# Patient Record
Sex: MALE | URBAN - METROPOLITAN AREA
[De-identification: names, ages, dates, MRNs, and addresses within clinical notes are randomized per-mention and may not be internally consistent; named-entity substitution may affect disease eponyms.]

---

## 2023-05-16 ENCOUNTER — INPATIENT (INPATIENT)
Facility: HOSPITAL | Age: 78
LOS: 3 days | Discharge: HOME | DRG: 871 | End: 2023-05-20
Attending: HOSPITALIST | Admitting: HOSPITALIST
Payer: MEDICARE

## 2023-05-16 VITALS
WEIGHT: 139.99 LBS | SYSTOLIC BLOOD PRESSURE: 117 MMHG | RESPIRATION RATE: 24 BRPM | HEART RATE: 87 BPM | DIASTOLIC BLOOD PRESSURE: 57 MMHG | OXYGEN SATURATION: 94 % | TEMPERATURE: 99 F

## 2023-05-16 DIAGNOSIS — A41.9 SEPSIS, UNSPECIFIED ORGANISM: ICD-10-CM

## 2023-05-16 LAB
ALBUMIN SERPL ELPH-MCNC: 4.2 G/DL — SIGNIFICANT CHANGE UP (ref 3.5–5.2)
ALP SERPL-CCNC: 80 U/L — SIGNIFICANT CHANGE UP (ref 30–115)
ALT FLD-CCNC: 12 U/L — SIGNIFICANT CHANGE UP (ref 0–41)
ANION GAP SERPL CALC-SCNC: 10 MMOL/L — SIGNIFICANT CHANGE UP (ref 7–14)
AST SERPL-CCNC: 19 U/L — SIGNIFICANT CHANGE UP (ref 0–41)
BASE EXCESS BLDV CALC-SCNC: -0.2 MMOL/L — SIGNIFICANT CHANGE UP (ref -2–3)
BASOPHILS # BLD AUTO: 0.03 K/UL — SIGNIFICANT CHANGE UP (ref 0–0.2)
BASOPHILS NFR BLD AUTO: 0.2 % — SIGNIFICANT CHANGE UP (ref 0–1)
BILIRUB SERPL-MCNC: 0.5 MG/DL — SIGNIFICANT CHANGE UP (ref 0.2–1.2)
BUN SERPL-MCNC: 19 MG/DL — SIGNIFICANT CHANGE UP (ref 10–20)
CA-I SERPL-SCNC: 1.16 MMOL/L — SIGNIFICANT CHANGE UP (ref 1.15–1.33)
CALCIUM SERPL-MCNC: 9 MG/DL — SIGNIFICANT CHANGE UP (ref 8.4–10.4)
CHLORIDE SERPL-SCNC: 100 MMOL/L — SIGNIFICANT CHANGE UP (ref 98–110)
CO2 SERPL-SCNC: 22 MMOL/L — SIGNIFICANT CHANGE UP (ref 17–32)
CREAT SERPL-MCNC: 0.9 MG/DL — SIGNIFICANT CHANGE UP (ref 0.7–1.5)
EGFR: 87 ML/MIN/1.73M2 — SIGNIFICANT CHANGE UP
EOSINOPHIL # BLD AUTO: 0.34 K/UL — SIGNIFICANT CHANGE UP (ref 0–0.7)
EOSINOPHIL NFR BLD AUTO: 2.7 % — SIGNIFICANT CHANGE UP (ref 0–8)
FLUAV AG NPH QL: SIGNIFICANT CHANGE UP
FLUBV AG NPH QL: SIGNIFICANT CHANGE UP
GAS PNL BLDV: 130 MMOL/L — LOW (ref 136–145)
GAS PNL BLDV: SIGNIFICANT CHANGE UP
GLUCOSE SERPL-MCNC: 117 MG/DL — HIGH (ref 70–99)
HCO3 BLDV-SCNC: 25 MMOL/L — SIGNIFICANT CHANGE UP (ref 22–29)
HCT VFR BLD CALC: 37.5 % — LOW (ref 42–52)
HCT VFR BLDA CALC: 45 % — SIGNIFICANT CHANGE UP (ref 39–51)
HGB BLD CALC-MCNC: 15 G/DL — SIGNIFICANT CHANGE UP (ref 12.6–17.4)
HGB BLD-MCNC: 12.4 G/DL — LOW (ref 14–18)
IMM GRANULOCYTES NFR BLD AUTO: 0.8 % — HIGH (ref 0.1–0.3)
LACTATE BLDV-MCNC: 2.2 MMOL/L — HIGH (ref 0.5–2)
LYMPHOCYTES # BLD AUTO: 0.47 K/UL — LOW (ref 1.2–3.4)
LYMPHOCYTES # BLD AUTO: 3.7 % — LOW (ref 20.5–51.1)
MCHC RBC-ENTMCNC: 33.1 G/DL — SIGNIFICANT CHANGE UP (ref 32–37)
MCHC RBC-ENTMCNC: 33.2 PG — HIGH (ref 27–31)
MCV RBC AUTO: 100.5 FL — HIGH (ref 80–94)
MONOCYTES # BLD AUTO: 0.81 K/UL — HIGH (ref 0.1–0.6)
MONOCYTES NFR BLD AUTO: 6.4 % — SIGNIFICANT CHANGE UP (ref 1.7–9.3)
NEUTROPHILS # BLD AUTO: 10.93 K/UL — HIGH (ref 1.4–6.5)
NEUTROPHILS NFR BLD AUTO: 86.2 % — HIGH (ref 42.2–75.2)
NRBC # BLD: 0 /100 WBCS — SIGNIFICANT CHANGE UP (ref 0–0)
NT-PROBNP SERPL-SCNC: 174 PG/ML — SIGNIFICANT CHANGE UP (ref 0–300)
PCO2 BLDV: 41 MMHG — LOW (ref 42–55)
PH BLDV: 7.39 — SIGNIFICANT CHANGE UP (ref 7.32–7.43)
PLATELET # BLD AUTO: 186 K/UL — SIGNIFICANT CHANGE UP (ref 130–400)
PMV BLD: 9.3 FL — SIGNIFICANT CHANGE UP (ref 7.4–10.4)
PO2 BLDV: 64 MMHG — SIGNIFICANT CHANGE UP
POTASSIUM BLDV-SCNC: 5.3 MMOL/L — HIGH (ref 3.5–5.1)
POTASSIUM SERPL-MCNC: 4 MMOL/L — SIGNIFICANT CHANGE UP (ref 3.5–5)
POTASSIUM SERPL-SCNC: 4 MMOL/L — SIGNIFICANT CHANGE UP (ref 3.5–5)
PROT SERPL-MCNC: 6.8 G/DL — SIGNIFICANT CHANGE UP (ref 6–8)
RBC # BLD: 3.73 M/UL — LOW (ref 4.7–6.1)
RBC # FLD: 11.9 % — SIGNIFICANT CHANGE UP (ref 11.5–14.5)
RSV RNA NPH QL NAA+NON-PROBE: SIGNIFICANT CHANGE UP
SAO2 % BLDV: 93.4 % — SIGNIFICANT CHANGE UP
SARS-COV-2 RNA SPEC QL NAA+PROBE: SIGNIFICANT CHANGE UP
SODIUM SERPL-SCNC: 132 MMOL/L — LOW (ref 135–146)
TROPONIN T SERPL-MCNC: <0.01 NG/ML — SIGNIFICANT CHANGE UP
WBC # BLD: 12.68 K/UL — HIGH (ref 4.8–10.8)
WBC # FLD AUTO: 12.68 K/UL — HIGH (ref 4.8–10.8)

## 2023-05-16 PROCEDURE — 97162 PT EVAL MOD COMPLEX 30 MIN: CPT | Mod: GP

## 2023-05-16 PROCEDURE — 97116 GAIT TRAINING THERAPY: CPT | Mod: GP

## 2023-05-16 PROCEDURE — 87206 SMEAR FLUORESCENT/ACID STAI: CPT

## 2023-05-16 PROCEDURE — 87070 CULTURE OTHR SPECIMN AEROBIC: CPT

## 2023-05-16 PROCEDURE — 84425 ASSAY OF VITAMIN B-1: CPT

## 2023-05-16 PROCEDURE — 85379 FIBRIN DEGRADATION QUANT: CPT

## 2023-05-16 PROCEDURE — 74230 X-RAY XM SWLNG FUNCJ C+: CPT

## 2023-05-16 PROCEDURE — 87641 MR-STAPH DNA AMP PROBE: CPT

## 2023-05-16 PROCEDURE — 83735 ASSAY OF MAGNESIUM: CPT

## 2023-05-16 PROCEDURE — 94640 AIRWAY INHALATION TREATMENT: CPT

## 2023-05-16 PROCEDURE — 93005 ELECTROCARDIOGRAM TRACING: CPT

## 2023-05-16 PROCEDURE — 84145 PROCALCITONIN (PCT): CPT

## 2023-05-16 PROCEDURE — 99291 CRITICAL CARE FIRST HOUR: CPT

## 2023-05-16 PROCEDURE — 0241U: CPT

## 2023-05-16 PROCEDURE — 87640 STAPH A DNA AMP PROBE: CPT

## 2023-05-16 PROCEDURE — 92610 EVALUATE SWALLOWING FUNCTION: CPT | Mod: GN

## 2023-05-16 PROCEDURE — 82746 ASSAY OF FOLIC ACID SERUM: CPT

## 2023-05-16 PROCEDURE — 97530 THERAPEUTIC ACTIVITIES: CPT | Mod: GP

## 2023-05-16 PROCEDURE — 82607 VITAMIN B-12: CPT

## 2023-05-16 PROCEDURE — 85025 COMPLETE CBC W/AUTO DIFF WBC: CPT

## 2023-05-16 PROCEDURE — 83605 ASSAY OF LACTIC ACID: CPT

## 2023-05-16 PROCEDURE — 71045 X-RAY EXAM CHEST 1 VIEW: CPT | Mod: 26

## 2023-05-16 PROCEDURE — 0225U NFCT DS DNA&RNA 21 SARSCOV2: CPT

## 2023-05-16 PROCEDURE — 80053 COMPREHEN METABOLIC PANEL: CPT

## 2023-05-16 PROCEDURE — 36415 COLL VENOUS BLD VENIPUNCTURE: CPT

## 2023-05-16 PROCEDURE — 87116 MYCOBACTERIA CULTURE: CPT

## 2023-05-16 PROCEDURE — 92526 ORAL FUNCTION THERAPY: CPT | Mod: GN

## 2023-05-16 PROCEDURE — 87015 SPECIMEN INFECT AGNT CONCNTJ: CPT

## 2023-05-16 PROCEDURE — 93010 ELECTROCARDIOGRAM REPORT: CPT

## 2023-05-16 PROCEDURE — 92611 MOTION FLUOROSCOPY/SWALLOW: CPT | Mod: GN

## 2023-05-16 PROCEDURE — 71250 CT THORAX DX C-: CPT

## 2023-05-16 PROCEDURE — 86480 TB TEST CELL IMMUN MEASURE: CPT

## 2023-05-16 PROCEDURE — 82962 GLUCOSE BLOOD TEST: CPT

## 2023-05-16 RX ORDER — LANOLIN ALCOHOL/MO/W.PET/CERES
3 CREAM (GRAM) TOPICAL AT BEDTIME
Refills: 0 | Status: DISCONTINUED | OUTPATIENT
Start: 2023-05-16 | End: 2023-05-20

## 2023-05-16 RX ORDER — CEFTRIAXONE 500 MG/1
1000 INJECTION, POWDER, FOR SOLUTION INTRAMUSCULAR; INTRAVENOUS ONCE
Refills: 0 | Status: COMPLETED | OUTPATIENT
Start: 2023-05-16 | End: 2023-05-16

## 2023-05-16 RX ORDER — IPRATROPIUM/ALBUTEROL SULFATE 18-103MCG
3 AEROSOL WITH ADAPTER (GRAM) INHALATION ONCE
Refills: 0 | Status: COMPLETED | OUTPATIENT
Start: 2023-05-16 | End: 2023-05-16

## 2023-05-16 RX ORDER — ACETAMINOPHEN 500 MG
975 TABLET ORAL ONCE
Refills: 0 | Status: COMPLETED | OUTPATIENT
Start: 2023-05-16 | End: 2023-05-16

## 2023-05-16 RX ORDER — ACETAMINOPHEN 500 MG
650 TABLET ORAL ONCE
Refills: 0 | Status: COMPLETED | OUTPATIENT
Start: 2023-05-16 | End: 2023-05-16

## 2023-05-16 RX ORDER — ENOXAPARIN SODIUM 100 MG/ML
40 INJECTION SUBCUTANEOUS EVERY 24 HOURS
Refills: 0 | Status: DISCONTINUED | OUTPATIENT
Start: 2023-05-16 | End: 2023-05-20

## 2023-05-16 RX ORDER — CEFEPIME 1 G/1
2000 INJECTION, POWDER, FOR SOLUTION INTRAMUSCULAR; INTRAVENOUS EVERY 12 HOURS
Refills: 0 | Status: DISCONTINUED | OUTPATIENT
Start: 2023-05-16 | End: 2023-05-17

## 2023-05-16 RX ORDER — VANCOMYCIN HCL 1 G
750 VIAL (EA) INTRAVENOUS EVERY 12 HOURS
Refills: 0 | Status: DISCONTINUED | OUTPATIENT
Start: 2023-05-16 | End: 2023-05-17

## 2023-05-16 RX ORDER — AZITHROMYCIN 500 MG/1
500 TABLET, FILM COATED ORAL ONCE
Refills: 0 | Status: COMPLETED | OUTPATIENT
Start: 2023-05-16 | End: 2023-05-16

## 2023-05-16 RX ADMIN — Medication 3 MILLILITER(S): at 21:21

## 2023-05-16 RX ADMIN — AZITHROMYCIN 255 MILLIGRAM(S): 500 TABLET, FILM COATED ORAL at 21:20

## 2023-05-16 RX ADMIN — Medication 3 MILLILITER(S): at 21:35

## 2023-05-16 RX ADMIN — Medication 650 MILLIGRAM(S): at 22:04

## 2023-05-16 RX ADMIN — Medication 3 MILLILITER(S): at 21:30

## 2023-05-16 RX ADMIN — CEFTRIAXONE 100 MILLIGRAM(S): 500 INJECTION, POWDER, FOR SOLUTION INTRAMUSCULAR; INTRAVENOUS at 21:20

## 2023-05-16 NOTE — ED PROVIDER NOTE - CARE PLAN
Principal Discharge DX:	Sepsis  Secondary Diagnosis:	Right lower lobe pneumonia  Secondary Diagnosis:	Bronchiectasis   1

## 2023-05-16 NOTE — ED ADULT NURSE NOTE - HOW OFTEN DO YOU HAVE A DRINK CONTAINING ALCOHOL?
Never Detail Level: Simple Additional Notes: [place] Kenalog 2.5mg/mL x .5cc OV only value $0 Render Risk Assessment In Note?: no

## 2023-05-16 NOTE — H&P ADULT - NSHPLABSRESULTS_GEN_ALL_CORE
12.4   12.68 )-----------( 186      ( 16 May 2023 19:45 )             37.5     05-16    132<L>  |  100  |  19  ----------------------------<  117<H>  4.0   |  22  |  0.9    Ca    9.0      16 May 2023 19:45    TPro  6.8  /  Alb  4.2  /  TBili  0.5  /  DBili  x   /  AST  19  /  ALT  12  /  AlkPhos  80  05-16      CARDIAC MARKERS ( 16 May 2023 19:45 )  x     / <0.01 ng/mL / x     / x     / x        LIVER FUNCTIONS - ( 16 May 2023 19:45 )  Alb: 4.2 g/dL / Pro: 6.8 g/dL / ALK PHOS: 80 U/L / ALT: 12 U/L / AST: 19 U/L / GGT: x           RADIOLOGY & ADDITIONAL STUDIES:

## 2023-05-16 NOTE — ED ADULT NURSE NOTE - OBJECTIVE STATEMENT
pt here for shortness of breath x 2 days. pt had recent dx of PNA. pt received combi tx in field pt here for shortness of breath x 2 days. pt had recent dx of PNA. pt received combi tx in field, aox1, disoriented to time/place/situation

## 2023-05-16 NOTE — H&P ADULT - ASSESSMENT
77-year-old male with a history of bronchiectasis, iron deficiency anemia brought in by family for evaluation of shortness of breath x2 days.     PLAN    #Suspected gram negative pneumonia in a setting of h/o bronchiectasis  #Sepsis present on admission  - febrile with Tmax 102.2 F  - Leukocytosis with WBC of 12.68   - tachypneic with RR 34   - lactate 2.2  - hypoxic with spO2 of 88% on RA; was placed on BiPAP with improvement in spO2   - remains on 3 L of NC   - s/p Azithromycin + Rocephin in the ED   - s/p Duonebs in the ED   - CXR: opacity in the R lung ( pending official read)   - start vancomycin 750 mg q12h; check vanc trough before the 4th dose   - start Cefepime 2 g q8h + Levaquin  mg once daily   - f/u MRSA swab  - f/u RVP   - f/u procal   - c/w Duonebs PRN   - ID consulted     #H/O Iron Deficiency anemia   - HgB 12.4   - .2  - Obtain iron studies   - f/u Vitamin B12 and Folate levels   - Monitor CBC for now     #DVT PPx: Lovenox subq  #GI PPx: Pantoprazole  #Diet: Regular   #Activity: IAT  #Dispo: SDU     77-year-old male with a history of bronchiectasis, iron deficiency anemia brought in by family for evaluation of shortness of breath x2 days.     PLAN    #Suspected gram negative pneumonia in a setting of h/o bronchiectasis  #Sepsis present on admission  - febrile with Tmax 102.2 F  - Leukocytosis with WBC of 12.68   - tachypneic with RR 34   - lactate 2.2  - hypoxic with spO2 of 88% on RA; was placed on BiPAP with improvement in spO2   - remains on 3 L of NC   - s/p Azithromycin + Rocephin in the ED   - s/p Duonebs in the ED   - CXR: opacity in the R lung ( pending official read)   - start vancomycin 750 mg q12h; check vanc trough before the 4th dose   - start Cefepime 2 g q12h + Levaquin  mg once daily   - f/u MRSA swab  - f/u RVP   - f/u procal   - c/w Duonebs PRN   - c/w guaianesin 400 mg q4h PRN for cough    - ID consulted     #H/O Iron Deficiency anemia   - HgB 12.4   - .2  - Obtain iron studies   - f/u Vitamin B12 and Folate levels   - Monitor CBC for now     #H/O Alzheimer's   - cw home Seroquel 25 mg QD     #DVT PPx: Lovenox subq  #GI PPx: Pantoprazole  #Diet: Regular   #Activity: IAT  #Dispo: SDU     77-year-old male with a history of bronchiectasis, iron deficiency anemia brought in by family for evaluation of shortness of breath x2 days.     PLAN    #Suspected gram negative pneumonia in a setting of h/o bronchiectasis  #Sepsis present on admission  - febrile with Tmax 102.2 F  - Leukocytosis with WBC of 12.68   - tachypneic with RR 34   - lactate 2.2  - hypoxic with spO2 of 88% on RA; was placed on BiPAP with improvement in spO2   - remains on 3 L of NC   - s/p Azithromycin + Rocephin in the ED   - s/p Duonebs in the ED   - CXR: opacity in the R lung ( pending official read)   - start vancomycin 750 mg q12h; check vanc trough before the 4th dose   - start Cefepime 2 g q12h + Levaquin  mg once daily   - f/u MRSA swab  - f/u RVP   - f/u procal   - f/u CT Chest Non contrast   - c/w Duonebs PRN   - c/w guaianesin 400 mg q4h PRN for cough    - ID consulted     #H/O Iron Deficiency anemia   - HgB 12.4   - .2  - Obtain iron studies   - f/u Vitamin B12 and Folate levels   - Monitor CBC for now     #H/O Alzheimer's   - cw home Seroquel 25 mg QD     #DVT PPx: Lovenox subq  #GI PPx: Pantoprazole  #Diet: Regular   #Activity: IAT  #Dispo: SDU

## 2023-05-16 NOTE — ED PROVIDER NOTE - OBJECTIVE STATEMENT
77-year-old male history of bronchiectasis, iron deficiency anemia brought in by family for evaluation of shortness of breath x2 days.  +new productive cough, chills, body aches and subjective fever.  Denies any runny nose, sore throat, sick contacts, recent travel, chest pain, abdominal pain, vomiting, diarrhea, leg pain and swelling.

## 2023-05-16 NOTE — H&P ADULT - CONVERSATION DETAILS
Pt has a had multiple admissions for pneumonia and bronchiectasis. Goals of Care Conversation done with daughter. Discussed FULL CODE VS DNR/DNI. CPR and intubation discussed with daughter agreed to Both. Pt family wishes the pt to be FULL code.  All questions answered.

## 2023-05-16 NOTE — H&P ADULT - NSICDXPASTMEDICALHX_GEN_ALL_CORE_FT
PAST MEDICAL HISTORY:  H/O bronchiectasis      PAST MEDICAL HISTORY:  Alzheimer's dementia     H/O bronchiectasis     H/O iron deficiency anemia

## 2023-05-16 NOTE — ED PROVIDER NOTE - CLINICAL SUMMARY MEDICAL DECISION MAKING FREE TEXT BOX
77-year-old male with history of bronchiectasis presents to the ED for shortness of breath over the past few days.  Noted to be febrile and tachypneic and hypoxic in the ED.  Patient was upgraded to critical care and placed on BiPAP immediately.  Patient noted diffuse wheezing and diffuse retractions.  Daughter at bedside who provided most of the story.  Patient was immediately seen by me.  Started on antibiotics.  Given fluids.  On reevaluation patient is tolerating BiPAP.  More alert.  Labs reviewed by me noted to have leukocytosis, mildly elevated lactate.  Case discussed with ICU.  Candidate for stepdown.  Admitted to stepdown.

## 2023-05-16 NOTE — H&P ADULT - NSHPPHYSICALEXAM_GEN_ALL_CORE
T(C): 39 (05-16-23 @ 19:10), Max: 39 (05-16-23 @ 19:10)  HR: 104 (05-16-23 @ 19:10) (87 - 104)  BP: 132/62 (05-16-23 @ 19:10) (117/57 - 132/62)  RR: 34 (05-16-23 @ 19:10) (24 - 34)  SpO2: 96% (05-16-23 @ 19:10) (94% - 96%)    CONSTITUTIONAL: Well groomed, no apparent distress    EYES: PERRLA and symmetric, EOMI, No conjunctival or scleral injection, non-icteric    ENMT: Oral mucosa with moist membranes. No external nasal lesions; nasal mucosa not inflamed; normal dentition; no pharyngeal injection or exudates    NECK: Supple, symmetric and without tracheal deviation; thyroid gland not enlarged and without palpable masses    RESPIRATORY:     CARDIOVASCULAR: RRRR, +S1S2, no murmurs  	  GASTROINTESTINAL: Soft, non tender, non distended, no rebound, no guarding  	  MUSCULOSKELETAL: generalized weakness +     SKIN: No rashes or ulcers noted; no subcutaneous nodules or induration palpable    NEUROLOGIC: No focal deficit     PSYCHIATRIC: Appropriate insight/judgment; A+O x 3, mood and affect appropriate, recent/remote memory intact T(C): 39 (05-16-23 @ 19:10), Max: 39 (05-16-23 @ 19:10)  HR: 104 (05-16-23 @ 19:10) (87 - 104)  BP: 132/62 (05-16-23 @ 19:10) (117/57 - 132/62)  RR: 34 (05-16-23 @ 19:10) (24 - 34)  SpO2: 96% (05-16-23 @ 19:10) (94% - 96%)    CONSTITUTIONAL: Well groomed, no apparent distress    EYES: PERRLA and symmetric, EOMI, No conjunctival or scleral injection, non-icteric    ENMT: Oral mucosa with moist membranes. No external nasal lesions; nasal mucosa not inflamed; normal dentition; no pharyngeal injection or exudates    NECK: Supple, symmetric and without tracheal deviation; thyroid gland not enlarged and without palpable masses    RESPIRATORY: Scattered wheezing bilateral lungs; decreased bs on inspiration on the R middle lobe area     CARDIOVASCULAR: RRRR, +S1S2, no murmurs  	  GASTROINTESTINAL: Soft, non tender, non distended, no rebound, no guarding  	  MUSCULOSKELETAL: generalized weakness +     SKIN: No rashes or ulcers noted; no subcutaneous nodules or induration palpable    NEUROLOGIC: No focal deficit     PSYCHIATRIC: Appropriate insight/judgment; A+O x 3, mood and affect appropriate, recent/remote memory intact

## 2023-05-16 NOTE — H&P ADULT - NSHPREVIEWOFSYSTEMS_GEN_ALL_CORE
CONSTITUTIONAL: As per HPI   EYES/ENT: No visual changes;  No vertigo or throat pain   NECK: No pain or stiffness  RESPIRATORY: As per HPI   CARDIOVASCULAR: No chest pain or palpitations  GASTROINTESTINAL: No abdominal or epigastric pain. No nausea, vomiting, or hematemesis; No diarrhea or constipation. No melena or hematochezia.  GENITOURINARY: No dysuria, frequency or hematuria  NEUROLOGICAL: No numbness or weakness  SKIN: No itching, rashes

## 2023-05-16 NOTE — ED ADULT NURSE REASSESSMENT NOTE - NSFALLRISKINTERV_ED_ALL_ED
Assistance OOB with selected safe patient handling equipment if applicable/Assistance with ambulation/Communicate fall risk and risk factors to all staff, patient, and family/Monitor gait and stability/Provide visual cue: yellow wristband, yellow gown, etc/Reinforce activity limits and safety measures with patient and family/Call bell, personal items and telephone in reach/Instruct patient to call for assistance before getting out of bed/chair/stretcher/Non-slip footwear applied when patient is off stretcher/[place] to call system/Physically safe environment - no spills, clutter or unnecessary equipment/Purposeful Proactive Rounding/Room/bathroom lighting operational, light cord in reach

## 2023-05-16 NOTE — ED PROVIDER NOTE - PHYSICAL EXAMINATION
CONSTITUTIONAL: Well-appearing; well-nourished; in no apparent distress.   EYES: PERRL; EOM intact.   ENT: normal nose; no rhinorrhea; normal pharynx with no tonsillar hypertrophy.   NECK: Supple; non-tender; no cervical lymphadenopathy.   CARDIOVASCULAR: Normal S1, S2; no murmurs, rubs, or gallops.   RESPIRATORY: Positive hypoxia O2 sat 89% on room air 88% on tachypnea.  Positive bilateral wheezing and rhonchi noted.  GI/: Normal bowel sounds; non-distended; non-tender; no palpable organomegaly.   MS: No evidence of trauma or deformity. Normal ROM in all four extremities; non-tender to palpation; distal pulses are normal.   SKIN: Normal for age and race; warm; dry; good turgor; no apparent lesions or exudate.   Extrem: No peripheral edema.  No calf tenderness.  NEURO/PSYCH: A & O x 3; grossly unremarkable. mood and manner are appropriate. Grooming and personal hygiene are appropriate.

## 2023-05-16 NOTE — H&P ADULT - ATTENDING COMMENTS
HPI as above.  Interval history: Pt seen and examined at bedside. DW daughter at bedside. Daughter states that the pt has been in and out of the hospital multiple times for pneumonia. She states that he coughs when he eats. No cp, Pt has dementia at baseline   Vital Signs (24 Hrs):  T(C): 36.6 (05-17-23 @ 16:32), Max: 39 (05-16-23 @ 19:10)  HR: 102 (05-17-23 @ 16:32) (77 - 104)  BP: 127/79 (05-17-23 @ 16:32) (116/58 - 136/63)  RR: 18 (05-17-23 @ 16:32) (18 - 34)  SpO2: 96% (05-17-23 @ 16:32) (94% - 100%)  Wt(kg): --  Daily     Daily     I&O's Summary    PHYSICAL EXAM:  GENERAL: NAD, well-developed  HEAD:  Atraumatic, Normocephalic  EYES: EOMI, PERRLA, conjunctiva and sclera clear  NECK: Supple, No JVD  CHEST/LUNG: BL ronchi   HEART: Regular rate and rhythm; No murmurs, rubs, or gallops  ABDOMEN: Soft, Nontender, Nondistended; Bowel sounds present  EXTREMITIES:  2+ Peripheral Pulses, No clubbing, cyanosis, or edema  PSYCH: Alert   NEUROLOGY: non-focal  SKIN: No rashes or lesions  Labs reviewed  Imaging reviewed independently and reviewed read  < from: CT Chest No Cont (05.17.23 @ 04:50) >    IMPRESSION:  Motion artifact throughout the lungs limits evaluation.    Right middle lobe right lower lobe, and left upper lobe bronchiectasis is   present. Near-complete atelectasis of the right lower lobe.    Scattered tree-in-bud nodules seen along the periphery of the right   middle lobe. Mild tree-in-bud nodules are also seen in the superior   segment of the left lower lobe (5/145).    Findings are suspicious for infection. TB should be excluded.    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    EKG reviewed independently and reviewed read  < from: 12 Lead ECG (05.17.23 @ 02:42) >    Diagnosis Line Normal sinus rhythm  Normal ECG    < end of copied text >    Plan  #ARF 2/2 acute on chronic bronchiectasis 2/2 gram neg vs gram pos pneumonia vs aspiration pneumonia   -CT noted, DW ID low suspicion for TB but requesting AFBs cultures   - antibiotics as per ID   - dw SS and pt doing MBS  - check duplex     #rest as above    #Progress Note Handoff  Pending (specify):  follow up ID, afbs, and as above  Family discussion: house staff updated pt family  Disposition:  downgrade from SDU to medicine as per CC   Decision to admit the pt is based on acuity as above  High risk given above acuity and comorbidities. labs reviewed. DW ID

## 2023-05-16 NOTE — H&P ADULT - HISTORY OF PRESENT ILLNESS
77-year-old male with a history of bronchiectasis, iron deficiency anemia brought in by family for evaluation of shortness of breath x2 days. Patient had new productive cough, chills, body aches and subjective fever.  Denies any runny nose, sore throat, sick contacts, recent travel, chest pain, abdominal pain, vomiting, diarrhea, leg pain and swelling.     In the ED vitals were:    T(F): 102.2  HR: 104  BP: 132/62  RR: 34  SpO2: 96%    Labs were significant for Leukocytosis with WBC 12.68 with neutrophilia (ANC 1093), Hgb 12.4, .5, Na+ 132; VBG: Lactate 2.3     Images: CXR wet read: opacity in the R lung     Patient was hypoxic to  88 %. He was placed on BiPAP with improvement in the spO2. Patient remains on 3 L of NC. He was given duonebs and was given Azithromycin and Rocephin in the ED.     Patient is being admitted to SDU for close monitoring and management of acute pneumonia.  77-year-old male with a history of bronchiectasis, iron deficiency anemia, and alzheimer's brought in by family for evaluation of shortness of breath x2 days. History obtained from patient's daughter at bedside. As per daughter, patient was admitted to [facility] in 08/2022 for similar complaints and was found to have Pneumonia there. Since then patient has had chronic cough and has been feeling weak. Patient had worsening cough for the past couple days and felt sudden shortness of breath and palpitations prompting the family to bring him to ED.  Patient had new productive cough with yellow sputum, chills, body aches and subjective fever.  Denies any runny nose, sore throat, sick contacts, recent travel, chest pain, abdominal pain, vomiting, diarrhea, leg pain and swelling.     In the ED vitals were:    T(F): 102.2  HR: 104  BP: 132/62  RR: 34  SpO2: 96%    Labs were significant for Leukocytosis with WBC 12.68 with neutrophilia (ANC 1093), Hgb 12.4, .5, Na+ 132; VBG: Lactate 2.3     Images: CXR wet read: opacity in the R lung     Patient was hypoxic to  88 %. He was placed on BiPAP with improvement in the spO2. Patient remains on 3 L of NC. He was given duonebs and was given Azithromycin and Rocephin in the ED.     Patient is being admitted to SDU for close monitoring and management of acute pneumonia.

## 2023-05-17 DIAGNOSIS — Z98.890 OTHER SPECIFIED POSTPROCEDURAL STATES: Chronic | ICD-10-CM

## 2023-05-17 LAB
ALBUMIN SERPL ELPH-MCNC: 3.7 G/DL — SIGNIFICANT CHANGE UP (ref 3.5–5.2)
ALP SERPL-CCNC: 64 U/L — SIGNIFICANT CHANGE UP (ref 30–115)
ALT FLD-CCNC: 11 U/L — SIGNIFICANT CHANGE UP (ref 0–41)
ANION GAP SERPL CALC-SCNC: 10 MMOL/L — SIGNIFICANT CHANGE UP (ref 7–14)
AST SERPL-CCNC: 18 U/L — SIGNIFICANT CHANGE UP (ref 0–41)
BASOPHILS # BLD AUTO: 0.02 K/UL — SIGNIFICANT CHANGE UP (ref 0–0.2)
BASOPHILS NFR BLD AUTO: 0.2 % — SIGNIFICANT CHANGE UP (ref 0–1)
BILIRUB SERPL-MCNC: 0.7 MG/DL — SIGNIFICANT CHANGE UP (ref 0.2–1.2)
BUN SERPL-MCNC: 12 MG/DL — SIGNIFICANT CHANGE UP (ref 10–20)
CALCIUM SERPL-MCNC: 8.7 MG/DL — SIGNIFICANT CHANGE UP (ref 8.4–10.4)
CHLORIDE SERPL-SCNC: 103 MMOL/L — SIGNIFICANT CHANGE UP (ref 98–110)
CO2 SERPL-SCNC: 26 MMOL/L — SIGNIFICANT CHANGE UP (ref 17–32)
CREAT SERPL-MCNC: 0.7 MG/DL — SIGNIFICANT CHANGE UP (ref 0.7–1.5)
D DIMER BLD IA.RAPID-MCNC: 365 NG/ML DDU — HIGH
EGFR: 94 ML/MIN/1.73M2 — SIGNIFICANT CHANGE UP
EOSINOPHIL # BLD AUTO: 0.32 K/UL — SIGNIFICANT CHANGE UP (ref 0–0.7)
EOSINOPHIL NFR BLD AUTO: 4 % — SIGNIFICANT CHANGE UP (ref 0–8)
GLUCOSE BLDC GLUCOMTR-MCNC: 137 MG/DL — HIGH (ref 70–99)
GLUCOSE SERPL-MCNC: 137 MG/DL — HIGH (ref 70–99)
HCT VFR BLD CALC: 35.8 % — LOW (ref 42–52)
HGB BLD-MCNC: 11.7 G/DL — LOW (ref 14–18)
IMM GRANULOCYTES NFR BLD AUTO: 0.9 % — HIGH (ref 0.1–0.3)
LACTATE SERPL-SCNC: 1.2 MMOL/L — SIGNIFICANT CHANGE UP (ref 0.7–2)
LYMPHOCYTES # BLD AUTO: 0.54 K/UL — LOW (ref 1.2–3.4)
LYMPHOCYTES # BLD AUTO: 6.7 % — LOW (ref 20.5–51.1)
MAGNESIUM SERPL-MCNC: 2.1 MG/DL — SIGNIFICANT CHANGE UP (ref 1.8–2.4)
MCHC RBC-ENTMCNC: 32.7 G/DL — SIGNIFICANT CHANGE UP (ref 32–37)
MCHC RBC-ENTMCNC: 33 PG — HIGH (ref 27–31)
MCV RBC AUTO: 100.8 FL — HIGH (ref 80–94)
MONOCYTES # BLD AUTO: 0.67 K/UL — HIGH (ref 0.1–0.6)
MONOCYTES NFR BLD AUTO: 8.3 % — SIGNIFICANT CHANGE UP (ref 1.7–9.3)
MRSA PCR RESULT.: NEGATIVE — SIGNIFICANT CHANGE UP
NEUTROPHILS # BLD AUTO: 6.41 K/UL — SIGNIFICANT CHANGE UP (ref 1.4–6.5)
NEUTROPHILS NFR BLD AUTO: 79.9 % — HIGH (ref 42.2–75.2)
NRBC # BLD: 0 /100 WBCS — SIGNIFICANT CHANGE UP (ref 0–0)
PLATELET # BLD AUTO: 161 K/UL — SIGNIFICANT CHANGE UP (ref 130–400)
PMV BLD: 9.4 FL — SIGNIFICANT CHANGE UP (ref 7.4–10.4)
POTASSIUM SERPL-MCNC: 3.8 MMOL/L — SIGNIFICANT CHANGE UP (ref 3.5–5)
POTASSIUM SERPL-SCNC: 3.8 MMOL/L — SIGNIFICANT CHANGE UP (ref 3.5–5)
PROCALCITONIN SERPL-MCNC: 0.08 NG/ML — SIGNIFICANT CHANGE UP (ref 0.02–0.1)
PROT SERPL-MCNC: 6.1 G/DL — SIGNIFICANT CHANGE UP (ref 6–8)
RBC # BLD: 3.55 M/UL — LOW (ref 4.7–6.1)
RBC # FLD: 12.1 % — SIGNIFICANT CHANGE UP (ref 11.5–14.5)
SODIUM SERPL-SCNC: 139 MMOL/L — SIGNIFICANT CHANGE UP (ref 135–146)
WBC # BLD: 8.03 K/UL — SIGNIFICANT CHANGE UP (ref 4.8–10.8)
WBC # FLD AUTO: 8.03 K/UL — SIGNIFICANT CHANGE UP (ref 4.8–10.8)

## 2023-05-17 PROCEDURE — 71250 CT THORAX DX C-: CPT | Mod: 26

## 2023-05-17 PROCEDURE — 93010 ELECTROCARDIOGRAM REPORT: CPT

## 2023-05-17 PROCEDURE — 99497 ADVNCD CARE PLAN 30 MIN: CPT | Mod: 25

## 2023-05-17 PROCEDURE — 99223 1ST HOSP IP/OBS HIGH 75: CPT

## 2023-05-17 PROCEDURE — 99233 SBSQ HOSP IP/OBS HIGH 50: CPT

## 2023-05-17 RX ORDER — ALBUTEROL 90 UG/1
2 AEROSOL, METERED ORAL
Refills: 0 | DISCHARGE

## 2023-05-17 RX ORDER — IPRATROPIUM/ALBUTEROL SULFATE 18-103MCG
3 AEROSOL WITH ADAPTER (GRAM) INHALATION EVERY 6 HOURS
Refills: 0 | Status: DISCONTINUED | OUTPATIENT
Start: 2023-05-17 | End: 2023-05-20

## 2023-05-17 RX ORDER — QUETIAPINE FUMARATE 200 MG/1
25 TABLET, FILM COATED ORAL AT BEDTIME
Refills: 0 | Status: DISCONTINUED | OUTPATIENT
Start: 2023-05-17 | End: 2023-05-20

## 2023-05-17 RX ORDER — QUETIAPINE FUMARATE 200 MG/1
1 TABLET, FILM COATED ORAL
Refills: 0 | DISCHARGE

## 2023-05-17 RX ORDER — CEFEPIME 1 G/1
2000 INJECTION, POWDER, FOR SOLUTION INTRAMUSCULAR; INTRAVENOUS EVERY 8 HOURS
Refills: 0 | Status: DISCONTINUED | OUTPATIENT
Start: 2023-05-17 | End: 2023-05-19

## 2023-05-17 RX ADMIN — Medication 3 MILLILITER(S): at 07:50

## 2023-05-17 RX ADMIN — Medication 3 MILLILITER(S): at 15:34

## 2023-05-17 RX ADMIN — ENOXAPARIN SODIUM 40 MILLIGRAM(S): 100 INJECTION SUBCUTANEOUS at 21:36

## 2023-05-17 RX ADMIN — CEFEPIME 100 MILLIGRAM(S): 1 INJECTION, POWDER, FOR SOLUTION INTRAMUSCULAR; INTRAVENOUS at 05:44

## 2023-05-17 RX ADMIN — CEFEPIME 100 MILLIGRAM(S): 1 INJECTION, POWDER, FOR SOLUTION INTRAMUSCULAR; INTRAVENOUS at 15:32

## 2023-05-17 RX ADMIN — CEFEPIME 100 MILLIGRAM(S): 1 INJECTION, POWDER, FOR SOLUTION INTRAMUSCULAR; INTRAVENOUS at 21:37

## 2023-05-17 RX ADMIN — Medication 250 MILLIGRAM(S): at 05:44

## 2023-05-17 RX ADMIN — QUETIAPINE FUMARATE 25 MILLIGRAM(S): 200 TABLET, FILM COATED ORAL at 21:36

## 2023-05-17 RX ADMIN — Medication 40 MILLIGRAM(S): at 11:53

## 2023-05-17 NOTE — CONSULT NOTE ADULT - TIME BILLING
77-year-old male with a history of bronchiectasis, iron deficiency anemia, and alzheimer's brought in by family for evaluation of shortness of breath x2 days    Impressions  #Sepsis present on admission (fever, WBC>12)  #Bronchiectasis exacerbation  - CT Chest No Cont (05.17.23 @ 04:50): Motion artifact throughout the lungs limits evaluation. Right middle lobe right lower lobe, and left upper lobe bronchiectasis is  present. Near-complete atelectasis of the right lower lobe. Scattered tree-in-bud nodules seen along the periphery of the right   middle lobe. Mild tree-in-bud nodules are also seen in the superior  segment of the left lower lobe (5/145). Findings are suspicious for infection. TB should be excluded. 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    #Alzheimer's dementia     Recommendations  - Discussed with family member -- long standing history of bronchiectasis since childhood - last hospitalization in 7/2022 at [facility]; last treated in March 2023 for bronchiectasis flare with levofloxacin   - likely bronchiectasis flare, but at risk for MAC/ALLEN - no previous films to compare CT imaging   - would try to reach out to his pulmonologist if worked up for NTM -- if not,  would check AFB cx x 3 for NTM work-up (low suspicion for Pulmonary tuberculosis)   - check quantiferon gold   - continue cefepime 2g q 8 hours   - continue doxycycline 100 mg BID for 5 days   - follow-up blood cx   - trend fever curve     Please call or message on Microsoft Teams if with any questions.  Spectra [phone]

## 2023-05-17 NOTE — CONSULT NOTE ADULT - ASSESSMENT
IMPRESSION:    Acute hypoxemic respiratory failure   Bronchiectasis with exacerbation   HO bronchiectasis   HO Dementia and poor functional status     PLAN:    CNS:  Avoid depressants.  Check b12, Folate, and thiamine levels.      HEENT: Oral care.  Speech and swallow when more awake     PULMONARY:  HOB @ 45 degrees.  Aspiration precautions.  Wean O2 as tolerated.  Aggressive pulmonary toilet  Nebs Q4 and PRN.  Solumedrol 40 mg daily for 5 days.      CARDIOVASCULAR:  Gentle hydration while NPO     GI: GI prophylaxis.  Feeding per speech.  Bowel regimen     RENAL:  Follow up lytes.  Correct as needed    INFECTIOUS DISEASE: Follow up cultures.  ROcephin and Doxy for now./  Procal.  Full RVP negative.  NasaL MRSA     HEMATOLOGICAL:  DVT prophylaxis.  Dimer     ENDOCRINE:  Follow up FS.  Insulin protocol if needed.    MUSCULOSKELETAL:  Bed rest.  PT OT     Downgrade to floor

## 2023-05-17 NOTE — PHYSICAL THERAPY INITIAL EVALUATION ADULT - ADDITIONAL COMMENTS
pt lives with family, has a flight of stairs to climb, ambulates with RW and assist, climbs stairs with min assist from family member

## 2023-05-17 NOTE — SWALLOW BEDSIDE ASSESSMENT ADULT - SWALLOW EVAL: DIAGNOSIS
+ overt s/s of aspiration with thin liquids. Mild oral dysphagia with regular solids. + tolerance for puree, soft & bite sized with mildly thick liquids without overt s/s of penetration/ aspiration. + overt s/s of aspiration with thin liquids. Mild oral dysphagia with regular solids. + tolerance for puree, soft & bite sized with mildly thick liquids without overt s/s of penetration/ aspiration. MBSS instrumental swallow is indicated to further assess swallow physiology 2' suspected aspiration PNA.

## 2023-05-17 NOTE — GOALS OF CARE CONVERSATION - ADVANCED CARE PLANNING - CONVERSATION DETAILS
Goals of care discussed with patient and daughter bedside given multiple hospitalizations for bronchiectasis exacerbations.   They would like to remain FULL/CODE.

## 2023-05-17 NOTE — PHYSICAL THERAPY INITIAL EVALUATION ADULT - GENERAL OBSERVATIONS, REHAB EVAL
2:50-3:28 pt encountered in bed in NAD, daughter present and able to translate.  +tele, + O2 via nasal canula, 3L/min

## 2023-05-17 NOTE — PATIENT PROFILE ADULT - FALL HARM RISK - HARM RISK INTERVENTIONS

## 2023-05-17 NOTE — CONSULT NOTE ADULT - SUBJECTIVE AND OBJECTIVE BOX
Consultation Requested by:    Patient is a 77y old  Male who presents with a chief complaint of Dyspnea (17 May 2023 07:11)    HPI:  77-year-old male with a history of bronchiectasis, iron deficiency anemia, and alzheimer's brought in by family for evaluation of shortness of breath x2 days. History obtained from patient's daughter at bedside. As per daughter, patient was admitted to [facility] in 08/2022 for similar complaints and was found to have Pneumonia there. Since then patient has had chronic cough and has been feeling weak. Patient had worsening cough for the past couple days and felt sudden shortness of breath and palpitations prompting the family to bring him to ED.  Patient had new productive cough with yellow sputum, chills, body aches and subjective fever.  Denies any runny nose, sore throat, sick contacts, recent travel, chest pain, abdominal pain, vomiting, diarrhea, leg pain and swelling.     In the ED vitals were:    T(F): 102.2  HR: 104  BP: 132/62  RR: 34  SpO2: 96%    Labs were significant for Leukocytosis with WBC 12.68 with neutrophilia (ANC 1093), Hgb 12.4, .5, Na+ 132; VBG: Lactate 2.3     Images: CXR wet read: opacity in the R lung     Patient was hypoxic to  88 %. He was placed on BiPAP with improvement in the spO2. Patient remains on 3 L of NC. He was given duonebs and was given Azithromycin and Rocephin in the ED.     Patient is being admitted to SDU for close monitoring and management of acute pneumonia.  (16 May 2023 22:11)      REVIEW OF SYSTEMS  All review of systems negative, except for those marked:  General:		[] Abnormal:  	[] Night Sweats		[] Fever		[] Weight Loss  Pulmonary/Cough:	[] Abnormal:  Cardiac/Chest Pain:	[] Abnormal:  Gastrointestinal:	[] Abnormal:  Eyes:			[] Abnormal:  ENT:			[] Abnormal:  Dysuria:		[] Abnormal:  Musculoskeletal	:	[] Abnormal:  Endocrine:		[] Abnormal:  Lymph Nodes:		[] Abnormal:  Headache:		[] Abnormal:  Skin:			[] Abnormal:  Allergy/Immune:	[] Abnormal:  Psychiatric:		[] Abnormal:  [] All other review of systems negative  [] Unable to obtain (explain):    Recent Ill Contacts:	[] No	[] Yes:  Recent Travel History:	[] No	[] Yes:  Recent Animal/Insect Exposure/Tick Bites:	[] No	[] Yes:    Allergies    No Known Allergies    Intolerances      Antimicrobials:  cefepime   IVPB 2000 milliGRAM(s) IV Intermittent every 12 hours  levoFLOXacin IVPB 750 milliGRAM(s) IV Intermittent every 24 hours  levoFLOXacin IVPB      vancomycin  IVPB 750 milliGRAM(s) IV Intermittent every 12 hours      Other Medications:  albuterol/ipratropium for Nebulization 3 milliLiter(s) Nebulizer every 6 hours PRN  enoxaparin Injectable 40 milliGRAM(s) SubCutaneous every 24 hours  melatonin 3 milliGRAM(s) Oral at bedtime PRN  methylPREDNISolone sodium succinate Injectable 40 milliGRAM(s) IV Push daily  QUEtiapine 25 milliGRAM(s) Oral at bedtime      FAMILY HISTORY:    PAST MEDICAL & SURGICAL HISTORY:  H/O bronchiectasis      Alzheimer's dementia      H/O iron deficiency anemia      History of lung surgery        SOCIAL HISTORY:    IMMUNIZATIONS  [] Up to Date		[] Not Up to Date:  Recent Immunizations:	[] No	[] Yes:    Daily     Daily   Head Circumference:  Vital Signs Last 24 Hrs  T(C): 36.5 (17 May 2023 07:48), Max: 39 (16 May 2023 19:10)  T(F): 97.7 (17 May 2023 07:48), Max: 102.2 (16 May 2023 19:10)  HR: 77 (17 May 2023 07:48) (77 - 104)  BP: 136/63 (17 May 2023 07:48) (116/58 - 136/63)  BP(mean): --  RR: 18 (17 May 2023 07:48) (18 - 34)  SpO2: 97% (17 May 2023 07:48) (94% - 100%)    Parameters below as of 17 May 2023 07:48  Patient On (Oxygen Delivery Method): nasal cannula  O2 Flow (L/min): 3      PHYSICAL EXAM  All physical exam findings normal, except for those marked:  General:	Normal: alert, neither acutely nor chronically ill-appearing, well developed/well   .		nourished, no respiratory distress  .		[] Abnormal:  Eyes		Normal: no conjunctival injection, no discharge, no photophobia, intact   .		extraocular movements, sclera not icteric  .		[] Abnormal:  ENT:		Normal: normal tympanic membranes; external ear normal, nares normal without   .		discharge, no pharyngeal erythema or exudates, no oral mucosal lesions, normal   .		tongue and lips  .		[] Abnormal:  Neck		Normal: supple, full range of motion, no nuchal rigidity  .		[] Abnormal:  Lymph Nodes	Normal: normal size and consistency, non-tender  .		[] Abnormal:  Cardiovascular	Normal: regular rate and variability; Normal S1, S2; No murmur  .		[] Abnormal:  Respiratory	Normal: no wheezing or crackles, bilateral audible breath sounds, no retractions  .		[] Abnormal:  Abdominal	Normal: soft; non-distended; non-tender; no hepatosplenomegaly or masses  .		[] Abnormal:  		Normal: normal external genitalia, no rash  .		[] Abnormal:  Extremities	Normal: FROM x4, no cyanosis or edema, symmetric pulses  .		[] Abnormal:  Skin		Normal: skin intact and not indurated; no rash, no desquamation  .		[] Abnormal:  Neurologic	Normal: alert, oriented as age-appropriate, affect appropriate; no weakness, no   .		facial asymmetry, moves all extremities, normal gait-child older than 18 months  .		[] Abnormal:  Musculoskeletal		Normal: no joint swelling, erythema, or tenderness; full range of motion   .			with no contractures; no muscle tenderness; no clubbing; no cyanosis;   .			no edema  .			[] Abnormal    Respiratory Support:		[] No	[] Yes:  Vasoactive medication infusion:	[] No	[] Yes:  Venous catheters:		[] No	[] Yes:  Bladder catheter:		[] No	[] Yes:  Other catheters or tubes:	[] No	[] Yes:    Lab Results:                        11.7   8.03  )-----------( 161      ( 17 May 2023 06:55 )             35.8     05-17    139  |  103  |  12  ----------------------------<  137<H>  3.8   |  26  |  0.7    Ca    8.7      17 May 2023 06:55  Mg     2.1     05-17    TPro  6.1  /  Alb  3.7  /  TBili  0.7  /  DBili  x   /  AST  18  /  ALT  11  /  AlkPhos  64  05-17    LIVER FUNCTIONS - ( 17 May 2023 06:55 )  Alb: 3.7 g/dL / Pro: 6.1 g/dL / ALK PHOS: 64 U/L / ALT: 11 U/L / AST: 18 U/L / GGT: x                 MICROBIOLOGY    [] Pathology slides reviewed and/or discussed with pathologist  [] Microbiology findings discussed with microbiologist or slides reviewed  [] Images erviewed with radiologist  [] Case discussed with an attending physician in addition to the patient's primary physician  [] Records, reports from outside [facility] reviewed    [] Patient requires continued monitoring for:  [] Total critical care time spent by attending physician: __ minutes, excluding procedure time. Consultation Requested by:    Patient is a 77y old  Male who presents with a chief complaint of Dyspnea (17 May 2023 07:11)    HPI:  77-year-old male with a history of bronchiectasis, iron deficiency anemia, and alzheimer's brought in by family for evaluation of shortness of breath x2 days. History obtained from patient's daughter at bedside. As per daughter, patient was admitted to [facility] in 08/2022 for similar complaints and was found to have Pneumonia there. Since then patient has had chronic cough and has been feeling weak. Patient had worsening cough for the past couple days and felt sudden shortness of breath and palpitations prompting the family to bring him to ED.  Patient had new productive cough with yellow sputum, chills, body aches and subjective fever.  Denies any runny nose, sore throat, sick contacts, recent travel, chest pain, abdominal pain, vomiting, diarrhea, leg pain and swelling.     In the ED vitals were:    T(F): 102.2  HR: 104  BP: 132/62  RR: 34  SpO2: 96%    Labs were significant for Leukocytosis with WBC 12.68 with neutrophilia (ANC 1093), Hgb 12.4, .5, Na+ 132; VBG: Lactate 2.3     Images: CXR wet read: opacity in the R lung     Patient was hypoxic to  88 %. He was placed on BiPAP with improvement in the spO2. Patient remains on 3 L of NC. He was given duonebs and was given Azithromycin and Rocephin in the ED.     Patient is being admitted to SDU for close monitoring and management of acute pneumonia.  (16 May 2023 22:11)      REVIEW OF SYSTEMS  All review of systems negative, except for those marked:  General:		[] Abnormal:  	[] Night Sweats		[x] Fever		[] Weight Loss  Pulmonary/Cough:	[x] Abnormal:  Cardiac/Chest Pain:	[] Abnormal:  Gastrointestinal:	[] Abnormal:  Eyes:			[] Abnormal:  ENT:			[] Abnormal:  Dysuria:		[] Abnormal:  Musculoskeletal	:	[] Abnormal:  Endocrine:		[] Abnormal:  Lymph Nodes:		[] Abnormal:  Headache:		[] Abnormal:  Skin:			[] Abnormal:  Allergy/Immune:	[] Abnormal:  Psychiatric:		[] Abnormal:  [] All other review of systems negative  [] Unable to obtain (explain):    Recent Ill Contacts:	[] No	[x] Yes:  Recent Travel History:	[x] No	[] Yes:  Recent Animal/Insect Exposure/Tick Bites:	[x] No	[] Yes:    Allergies    No Known Allergies    Intolerances      Antimicrobials:  cefepime   IVPB 2000 milliGRAM(s) IV Intermittent every 12 hours  levoFLOXacin IVPB 750 milliGRAM(s) IV Intermittent every 24 hours  levoFLOXacin IVPB      vancomycin  IVPB 750 milliGRAM(s) IV Intermittent every 12 hours      Other Medications:  albuterol/ipratropium for Nebulization 3 milliLiter(s) Nebulizer every 6 hours PRN  enoxaparin Injectable 40 milliGRAM(s) SubCutaneous every 24 hours  melatonin 3 milliGRAM(s) Oral at bedtime PRN  methylPREDNISolone sodium succinate Injectable 40 milliGRAM(s) IV Push daily  QUEtiapine 25 milliGRAM(s) Oral at bedtime      FAMILY HISTORY:    PAST MEDICAL & SURGICAL HISTORY:  H/O bronchiectasis      Alzheimer's dementia      H/O iron deficiency anemia      History of lung surgery        SOCIAL HISTORY:    IMMUNIZATIONS  [] Up to Date		[] Not Up to Date:  Recent Immunizations:	[] No	[] Yes:    Daily     Daily   Head Circumference:  Vital Signs Last 24 Hrs  T(C): 36.5 (17 May 2023 07:48), Max: 39 (16 May 2023 19:10)  T(F): 97.7 (17 May 2023 07:48), Max: 102.2 (16 May 2023 19:10)  HR: 77 (17 May 2023 07:48) (77 - 104)  BP: 136/63 (17 May 2023 07:48) (116/58 - 136/63)  BP(mean): --  RR: 18 (17 May 2023 07:48) (18 - 34)  SpO2: 97% (17 May 2023 07:48) (94% - 100%)    Parameters below as of 17 May 2023 07:48  Patient On (Oxygen Delivery Method): nasal cannula  O2 Flow (L/min): 3      PHYSICAL EXAM  All physical exam findings normal, except for those marked:  General:	Normal: alert, ill appearing, on 3-4 o2 NC satting 94-97%  .		[] Abnormal:  Eyes		Normal: no conjunctival injection, no discharge, no photophobia, intact   .		extraocular movements, sclera not icteric  .		[] Abnormal:  ENT:		Normal: normal tympanic membranes; external ear normal, nares normal without   .		discharge, no pharyngeal erythema or exudates, no oral mucosal lesions, normal   .		tongue and lips  .		[] Abnormal:  Neck		Normal: supple, full range of motion, no nuchal rigidity  .		[] Abnormal:  Lymph Nodes	Normal: normal size and consistency, non-tender  .		[] Abnormal:  Cardiovascular	Normal: regular rate and variability; Normal S1, S2; No murmur  .		[] Abnormal:  Respiratory	Normal: no wheezing or crackles, bilateral audible breath sounds, no retractions  .		[] Abnormal:  Abdominal	Normal: soft; non-distended; non-tender; no hepatosplenomegaly or masses  .		[] Abnormal:  		Normal: normal external genitalia, no rash  .		[] Abnormal:  Extremities	Normal: FROM x4, no cyanosis or edema, symmetric pulses  .		[] Abnormal:  Skin		Normal: skin intact and not indurated; no rash, no desquamation  .		[] Abnormal:  Neurologic	Normal: alert, oriented as age-appropriate, affect appropriate; no weakness, no   .		facial asymmetry, moves all extremities, normal gait-child older than 18 months  .		[] Abnormal:  Musculoskeletal		Normal: no joint swelling, erythema, or tenderness; full range of motion   .			with no contractures; no muscle tenderness; no clubbing; no cyanosis;   .			no edema  .			[] Abnormal    Respiratory Support:		[] No	[] Yes:  Vasoactive medication infusion:	[] No	[] Yes:  Venous catheters:		[] No	[] Yes:  Bladder catheter:		[] No	[] Yes:  Other catheters or tubes:	[] No	[] Yes:    Lab Results:                        11.7   8.03  )-----------( 161      ( 17 May 2023 06:55 )             35.8     05-17    139  |  103  |  12  ----------------------------<  137<H>  3.8   |  26  |  0.7    Ca    8.7      17 May 2023 06:55  Mg     2.1     05-17    TPro  6.1  /  Alb  3.7  /  TBili  0.7  /  DBili  x   /  AST  18  /  ALT  11  /  AlkPhos  64  05-17    LIVER FUNCTIONS - ( 17 May 2023 06:55 )  Alb: 3.7 g/dL / Pro: 6.1 g/dL / ALK PHOS: 64 U/L / ALT: 11 U/L / AST: 18 U/L / GGT: x                 MICROBIOLOGY    [] Pathology slides reviewed and/or discussed with pathologist  [] Microbiology findings discussed with microbiologist or slides reviewed  [] Images erviewed with radiologist  [] Case discussed with an attending physician in addition to the patient's primary physician  [] Records, reports from outside [facility] [place]    [] Patient requires continued monitoring for:  [] Total critical care time spent by attending physician: __ minutes, excluding procedure time. Consultation Requested by:    Patient is a 77y old  Male who presents with a chief complaint of Dyspnea (17 May 2023 07:11)    HPI:  77-year-old male with a history of bronchiectasis, iron deficiency anemia, and alzheimer's brought in by family for evaluation of shortness of breath x2 days. History obtained from patient's daughter at bedside. As per daughter, patient was admitted to [facility] in 08/2022 for similar complaints and was found to have Pneumonia there. Since then patient has had chronic cough and has been feeling weak. Patient had worsening cough for the past couple days and felt sudden shortness of breath and palpitations prompting the family to bring him to ED.  Patient had new productive cough with yellow sputum, chills, body aches and subjective fever.  Denies any runny nose, sore throat, sick contacts, recent travel, chest pain, abdominal pain, vomiting, diarrhea, leg pain and swelling.     In the ED vitals were:    T(F): 102.2  HR: 104  BP: 132/62  RR: 34  SpO2: 96%    Labs were significant for Leukocytosis with WBC 12.68 with neutrophilia (ANC 1093), Hgb 12.4, .5, Na+ 132; VBG: Lactate 2.3     Images: CXR wet read: opacity in the R lung     Patient was hypoxic to  88 %. He was placed on BiPAP with improvement in the spO2. Patient remains on 3 L of NC. He was given duonebs and was given Azithromycin and Rocephin in the ED.     Patient is being admitted to SDU for close monitoring and management of acute pneumonia.  (16 May 2023 22:11)      REVIEW OF SYSTEMS  All review of systems negative, except for those marked:  General:		[] Abnormal:  	[] Night Sweats		[x] Fever		[] Weight Loss  Pulmonary/Cough:	[x] Abnormal:  Cardiac/Chest Pain:	[] Abnormal:  Gastrointestinal:	[] Abnormal:  Eyes:			[] Abnormal:  ENT:			[] Abnormal:  Dysuria:		[] Abnormal:  Musculoskeletal	:	[] Abnormal:  Endocrine:		[] Abnormal:  Lymph Nodes:		[] Abnormal:  Headache:		[] Abnormal:  Skin:			[] Abnormal:  Allergy/Immune:	[] Abnormal:  Psychiatric:		[] Abnormal:  [] All other review of systems negative  [] Unable to obtain (explain):    Recent Ill Contacts:	[] No	[x] Yes:  Recent Travel History:	[x] No	[] Yes:  Recent Animal/Insect Exposure/Tick Bites:	[x] No	[] Yes:    Allergies    No Known Allergies    Intolerances      Antimicrobials:  cefepime   IVPB 2000 milliGRAM(s) IV Intermittent every 12 hours  levoFLOXacin IVPB 750 milliGRAM(s) IV Intermittent every 24 hours  levoFLOXacin IVPB      vancomycin  IVPB 750 milliGRAM(s) IV Intermittent every 12 hours      Other Medications:  albuterol/ipratropium for Nebulization 3 milliLiter(s) Nebulizer every 6 hours PRN  enoxaparin Injectable 40 milliGRAM(s) SubCutaneous every 24 hours  melatonin 3 milliGRAM(s) Oral at bedtime PRN  methylPREDNISolone sodium succinate Injectable 40 milliGRAM(s) IV Push daily  QUEtiapine 25 milliGRAM(s) Oral at bedtime      FAMILY HISTORY:    PAST MEDICAL & SURGICAL HISTORY:  H/O bronchiectasis      Alzheimer's dementia      H/O iron deficiency anemia      History of lung surgery        SOCIAL HISTORY:    IMMUNIZATIONS  [] Up to Date		[] Not Up to Date:  Recent Immunizations:	[] No	[] Yes:    Daily     Daily   Head Circumference:  Vital Signs Last 24 Hrs  T(C): 36.5 (17 May 2023 07:48), Max: 39 (16 May 2023 19:10)  T(F): 97.7 (17 May 2023 07:48), Max: 102.2 (16 May 2023 19:10)  HR: 77 (17 May 2023 07:48) (77 - 104)  BP: 136/63 (17 May 2023 07:48) (116/58 - 136/63)  BP(mean): --  RR: 18 (17 May 2023 07:48) (18 - 34)  SpO2: 97% (17 May 2023 07:48) (94% - 100%)    Parameters below as of 17 May 2023 07:48  Patient On (Oxygen Delivery Method): nasal cannula  O2 Flow (L/min): 3      PHYSICAL EXAM  All physical exam findings normal, except for those marked:  General:	Normal: alert, ill appearing, on 3-4 o2 NC satting 94-97%  .		[] Abnormal:  Eyes		Normal: no conjunctival injection, no discharge, no photophobia, intact   .		extraocular movements, sclera not icteric  .		[] Abnormal:  ENT:		Normal: normal tympanic membranes; external ear normal, nares normal without   .		discharge, no pharyngeal erythema or exudates, no oral mucosal lesions, normal   .		tongue and lips  .		[] Abnormal:  Neck		Normal: supple, full range of motion, no nuchal rigidity  .		[] Abnormal:  Lymph Nodes	Normal: normal size and consistency, non-tender  .		[] Abnormal:  Cardiovascular	Normal: regular rate and variability; Normal S1, S2; No murmur  .		[] Abnormal:  Respiratory	Normal: no wheezing or crackles, bilateral audible breath sounds, no retractions  .		[] Abnormal:  Abdominal	Normal: soft; non-distended; non-tender; no hepatosplenomegaly or masses  .		[] Abnormal:  		Normal: normal external genitalia, no rash  .		[] Abnormal:  Extremities	Normal: FROM x4, no cyanosis or edema, symmetric pulses  .		[] Abnormal:  Skin		Normal: skin intact and not indurated; no rash, no desquamation  .		[] Abnormal:  Neurologic	Normal: alert, oriented as age-appropriate, affect appropriate; no weakness, no   .		facial asymmetry, moves all extremities, normal gait-child older than 18 months  .		[] Abnormal:  Musculoskeletal		Normal: no joint swelling, erythema, or tenderness; full range of motion   .			with no contractures; no muscle tenderness; no clubbing; no cyanosis;   .			no edema  .			[] Abnormal    Respiratory Support:		[] No	[] Yes:  Vasoactive medication infusion:	[] No	[] Yes:  Venous catheters:		[] No	[] Yes:  Bladder catheter:		[] No	[] Yes:  Other catheters or tubes:	[] No	[] Yes:    Lab Results:                        11.7   8.03  )-----------( 161      ( 17 May 2023 06:55 )             35.8     05-17    139  |  103  |  12  ----------------------------<  137<H>  3.8   |  26  |  0.7    Ca    8.7      17 May 2023 06:55  Mg     2.1     05-17    TPro  6.1  /  Alb  3.7  /  TBili  0.7  /  DBili  x   /  AST  18  /  ALT  11  /  AlkPhos  64  05-17    LIVER FUNCTIONS - ( 17 May 2023 06:55 )  Alb: 3.7 g/dL / Pro: 6.1 g/dL / ALK PHOS: 64 U/L / ALT: 11 U/L / AST: 18 U/L / GGT: x               < from: CT Chest No Cont (05.17.23 @ 04:50) >  LUNGS, PLEURA, AIRWAYS: The central airways are patent. Bilateral   calcified pleural plaques present. Right middle lobe right lower lobe,   and left upper lobe bronchiectasis is present. Near-complete atelectasis   of the right lower lobe. Minimal left basilar atelectasis. No pleural   effusion or pneumothorax. No honeycombing.    < end of copied text >

## 2023-05-17 NOTE — SWALLOW BEDSIDE ASSESSMENT ADULT - ADDITIONAL RECOMMENDATIONS
SLP will plan to f/u to assess candidacy for instrumental swallow. SLP will f/u 5/18 with MBSS instrumental swallow.

## 2023-05-17 NOTE — PHYSICAL THERAPY INITIAL EVALUATION ADULT - PERTINENT HX OF CURRENT PROBLEM, REHAB EVAL
77-year-old male with a history of bronchiectasis, iron deficiency anemia, and alzheimer's brought in by family for evaluation of shortness of breath x2 days. History obtained from patient's daughter at bedside. As per daughter, patient was admitted to [facility] in 08/2022 for similar complaints and was found to have Pneumonia there. Since then patient has had chronic cough and has been feeling weak. Patient had worsening cough for the past couple days and felt sudden shortness of breath and palpitations prompting the family to bring him to ED.  Patient had new productive cough with yellow sputum, chills, body aches and subjective fever.  Denies any runny nose, sore throat, sick contacts, recent travel, chest pain, abdominal pain, vomiting, diarrhea, leg pain and swelling.

## 2023-05-17 NOTE — CONSULT NOTE ADULT - ASSESSMENT
Assessment and Plan    # Bronchiectasis Exacerbation  -Cefepime 2g q8 IV  -Doxycycline 100mg q12 IV  -steroids as per primary team  -would likely need minimum 10-14 day course total duration    d/w'd with ID attending Dr. Clark    Note is incomplete pending updates to ROS/PE and Assessment Assessment and Plan  76 yo M with bronchiectasis, asbestos exposure, mod-severe alzheimer's dz presents with acute dypsnea, fevers. Dx'd with bronchiectasis flare. Pt has had one hospitalization in July 2022 at [facility] treated for pna. Had two episodes of pna outpatient last treated with abx roughly March 2023. Pt's daughter notes her children had a cough recently. No recent travel. Etiology of bronchiectasis is unclear as per daughter, has not been worked up. Pt has a pulmonologist in [place] Dr. Luis Manuel Castaneda. See CT scan finding above. MRSA nares negative.    # sepsis present on admission  # Bronchiectasis Exacerbation  # Hx of asbestos exposure  -Cefepime 2g q8 IV  -Doxycycline 100mg q12 IV  -sputum culture  -blood cultures  -full RVP  -steroids as per primary team  -would likely need minimum 10-14 day course total duration    d/w'd with ID attending Dr. Clark

## 2023-05-17 NOTE — CONSULT NOTE ADULT - SUBJECTIVE AND OBJECTIVE BOX
Patient is a 77y old  Male who presents with a chief complaint of Dyspnea (16 May 2023 22:11)      HPI:  77-year-old male with a history of bronchiectasis, iron deficiency anemia, and alzheimer's brought in by family for evaluation of shortness of breath x2 days. History obtained from patient's daughter at bedside. As per daughter, patient was admitted to [facility] in 08/2022 for similar complaints and was found to have Pneumonia there. Since then patient has had chronic cough and has been feeling weak. Patient had worsening cough for the past couple days and felt sudden shortness of breath and palpitations prompting the family to bring him to ED.  Patient had new productive cough with yellow sputum, chills, body aches and subjective fever.  Denies any runny nose, sore throat, sick contacts, recent travel, chest pain, abdominal pain, vomiting, diarrhea, leg pain and swelling.     In the ED vitals were:    T(F): 102.2  HR: 104  BP: 132/62  RR: 34  SpO2: 96%    Labs were significant for Leukocytosis with WBC 12.68 with neutrophilia (ANC 1093), Hgb 12.4, .5, Na+ 132; VBG: Lactate 2.3     Images: CXR wet read: opacity in the R lung     Patient was hypoxic to  88 %. He was placed on BiPAP with improvement in the spO2. Patient remains on 3 L of NC. He was given duonebs and was given Azithromycin and Rocephin in the ED.     Patient is being admitted to SDU for close monitoring and management of acute pneumonia.  (16 May 2023 22:11)      PAST MEDICAL & SURGICAL HISTORY:  H/O bronchiectasis      Alzheimer's dementia      H/O iron deficiency anemia      History of lung surgery          SOCIAL HX:   Smoking      UTO                    ETOH                            Other    FAMILY HISTORY:  :  No known cardiovacular family hisotry     Review Of Systems:     All ROS are negative except per HPI       Allergies    No Known Allergies    Intolerances          PHYSICAL EXAM    ICU Vital Signs Last 24 Hrs  T(C): 36.5 (17 May 2023 05:50), Max: 39 (16 May 2023 19:10)  T(F): 97.7 (17 May 2023 05:50), Max: 102.2 (16 May 2023 19:10)  HR: 84 (17 May 2023 05:50) (79 - 104)  BP: 116/58 (17 May 2023 05:50) (116/58 - 132/62)  BP(mean): --  ABP: --  ABP(mean): --  RR: 18 (17 May 2023 05:50) (18 - 34)  SpO2: 100% (17 May 2023 05:50) (94% - 100%)    O2 Parameters below as of 17 May 2023 05:50  Patient On (Oxygen Delivery Method): nasal cannula  O2 Flow (L/min): 3          CONSTITUTIONAL:  Ill appearing in NAD    ENT:   Airway patent,   Mouth with normal mucosa.   No thrush      CARDIAC:   Normal rate,   Regular rhythm.    No edema    RESPIRATORY:   No wheezing  Bibasilar crackles    Not tachypneic,  No use of accessory muscles    GASTROINTESTINAL:  Abdomen soft,   Non-tender,   No guarding,   + BS      NEUROLOGICAL:   Confused  No motor deficits.    SKIN:   Skin normal color for race,   No evidence of rash.                LABS:                          12.4   12.68 )-----------( 186      ( 16 May 2023 19:45 )             37.5                                               05-16    132<L>  |  100  |  19  ----------------------------<  117<H>  4.0   |  22  |  0.9    Ca    9.0      16 May 2023 19:45    TPro  6.8  /  Alb  4.2  /  TBili  0.5  /  DBili  x   /  AST  19  /  ALT  12  /  AlkPhos  80  05-16                                                 CARDIAC MARKERS ( 16 May 2023 19:45 )  x     / <0.01 ng/mL / x     / x     / x                                                LIVER FUNCTIONS - ( 16 May 2023 19:45 )  Alb: 4.2 g/dL / Pro: 6.8 g/dL / ALK PHOS: 80 U/L / ALT: 12 U/L / AST: 19 U/L / GGT: x                                                                                                                                       X-Rays reviewed                                                                                     ECHO    CXR interpreted by me     MEDICATIONS  (STANDING):  cefepime   IVPB 2000 milliGRAM(s) IV Intermittent every 12 hours  enoxaparin Injectable 40 milliGRAM(s) SubCutaneous every 24 hours  levoFLOXacin IVPB 750 milliGRAM(s) IV Intermittent every 24 hours  levoFLOXacin IVPB      QUEtiapine 25 milliGRAM(s) Oral at bedtime  vancomycin  IVPB 750 milliGRAM(s) IV Intermittent every 12 hours    MEDICATIONS  (PRN):  albuterol/ipratropium for Nebulization 3 milliLiter(s) Nebulizer every 6 hours PRN Shortness of Breath and/or Wheezing  melatonin 3 milliGRAM(s) Oral at bedtime PRN Insomnia

## 2023-05-18 LAB
ALBUMIN SERPL ELPH-MCNC: 4 G/DL — SIGNIFICANT CHANGE UP (ref 3.5–5.2)
ALP SERPL-CCNC: 68 U/L — SIGNIFICANT CHANGE UP (ref 30–115)
ALT FLD-CCNC: 13 U/L — SIGNIFICANT CHANGE UP (ref 0–41)
ANION GAP SERPL CALC-SCNC: 9 MMOL/L — SIGNIFICANT CHANGE UP (ref 7–14)
AST SERPL-CCNC: 16 U/L — SIGNIFICANT CHANGE UP (ref 0–41)
BASOPHILS # BLD AUTO: 0.01 K/UL — SIGNIFICANT CHANGE UP (ref 0–0.2)
BASOPHILS NFR BLD AUTO: 0.1 % — SIGNIFICANT CHANGE UP (ref 0–1)
BILIRUB SERPL-MCNC: 0.9 MG/DL — SIGNIFICANT CHANGE UP (ref 0.2–1.2)
BUN SERPL-MCNC: 11 MG/DL — SIGNIFICANT CHANGE UP (ref 10–20)
CALCIUM SERPL-MCNC: 9 MG/DL — SIGNIFICANT CHANGE UP (ref 8.4–10.5)
CHLORIDE SERPL-SCNC: 100 MMOL/L — SIGNIFICANT CHANGE UP (ref 98–110)
CO2 SERPL-SCNC: 26 MMOL/L — SIGNIFICANT CHANGE UP (ref 17–32)
CREAT SERPL-MCNC: 0.7 MG/DL — SIGNIFICANT CHANGE UP (ref 0.7–1.5)
EGFR: 94 ML/MIN/1.73M2 — SIGNIFICANT CHANGE UP
EOSINOPHIL # BLD AUTO: 0.01 K/UL — SIGNIFICANT CHANGE UP (ref 0–0.7)
EOSINOPHIL NFR BLD AUTO: 0.1 % — SIGNIFICANT CHANGE UP (ref 0–8)
GLUCOSE BLDC GLUCOMTR-MCNC: 118 MG/DL — HIGH (ref 70–99)
GLUCOSE BLDC GLUCOMTR-MCNC: 119 MG/DL — HIGH (ref 70–99)
GLUCOSE BLDC GLUCOMTR-MCNC: 127 MG/DL — HIGH (ref 70–99)
GLUCOSE BLDC GLUCOMTR-MCNC: 144 MG/DL — HIGH (ref 70–99)
GLUCOSE SERPL-MCNC: 200 MG/DL — HIGH (ref 70–99)
GRAM STN FLD: SIGNIFICANT CHANGE UP
HCT VFR BLD CALC: 38.7 % — LOW (ref 42–52)
HGB BLD-MCNC: 13 G/DL — LOW (ref 14–18)
IMM GRANULOCYTES NFR BLD AUTO: 1 % — HIGH (ref 0.1–0.3)
LYMPHOCYTES # BLD AUTO: 0.24 K/UL — LOW (ref 1.2–3.4)
LYMPHOCYTES # BLD AUTO: 3.5 % — LOW (ref 20.5–51.1)
MAGNESIUM SERPL-MCNC: 2.2 MG/DL — SIGNIFICANT CHANGE UP (ref 1.8–2.4)
MCHC RBC-ENTMCNC: 33.6 G/DL — SIGNIFICANT CHANGE UP (ref 32–37)
MCHC RBC-ENTMCNC: 33.8 PG — HIGH (ref 27–31)
MCV RBC AUTO: 100.5 FL — HIGH (ref 80–94)
MONOCYTES # BLD AUTO: 0.07 K/UL — LOW (ref 0.1–0.6)
MONOCYTES NFR BLD AUTO: 1 % — LOW (ref 1.7–9.3)
NEUTROPHILS # BLD AUTO: 6.52 K/UL — HIGH (ref 1.4–6.5)
NEUTROPHILS NFR BLD AUTO: 94.3 % — HIGH (ref 42.2–75.2)
NRBC # BLD: 0 /100 WBCS — SIGNIFICANT CHANGE UP (ref 0–0)
PLATELET # BLD AUTO: 166 K/UL — SIGNIFICANT CHANGE UP (ref 130–400)
PMV BLD: 9.2 FL — SIGNIFICANT CHANGE UP (ref 7.4–10.4)
POTASSIUM SERPL-MCNC: 3.9 MMOL/L — SIGNIFICANT CHANGE UP (ref 3.5–5)
POTASSIUM SERPL-SCNC: 3.9 MMOL/L — SIGNIFICANT CHANGE UP (ref 3.5–5)
PROT SERPL-MCNC: 6.4 G/DL — SIGNIFICANT CHANGE UP (ref 6–8)
RAPID RVP RESULT: DETECTED
RBC # BLD: 3.85 M/UL — LOW (ref 4.7–6.1)
RBC # FLD: 12 % — SIGNIFICANT CHANGE UP (ref 11.5–14.5)
RV+EV RNA SPEC QL NAA+PROBE: DETECTED
SARS-COV-2 RNA SPEC QL NAA+PROBE: SIGNIFICANT CHANGE UP
SODIUM SERPL-SCNC: 135 MMOL/L — SIGNIFICANT CHANGE UP (ref 135–146)
SPECIMEN SOURCE: SIGNIFICANT CHANGE UP
WBC # BLD: 6.92 K/UL — SIGNIFICANT CHANGE UP (ref 4.8–10.8)
WBC # FLD AUTO: 6.92 K/UL — SIGNIFICANT CHANGE UP (ref 4.8–10.8)

## 2023-05-18 PROCEDURE — 99232 SBSQ HOSP IP/OBS MODERATE 35: CPT

## 2023-05-18 PROCEDURE — 74230 X-RAY XM SWLNG FUNCJ C+: CPT | Mod: 26

## 2023-05-18 RX ADMIN — Medication 100 MILLIGRAM(S): at 17:11

## 2023-05-18 RX ADMIN — CEFEPIME 100 MILLIGRAM(S): 1 INJECTION, POWDER, FOR SOLUTION INTRAMUSCULAR; INTRAVENOUS at 05:37

## 2023-05-18 RX ADMIN — Medication 100 MILLIGRAM(S): at 06:03

## 2023-05-18 RX ADMIN — ENOXAPARIN SODIUM 40 MILLIGRAM(S): 100 INJECTION SUBCUTANEOUS at 21:16

## 2023-05-18 RX ADMIN — CEFEPIME 100 MILLIGRAM(S): 1 INJECTION, POWDER, FOR SOLUTION INTRAMUSCULAR; INTRAVENOUS at 21:16

## 2023-05-18 RX ADMIN — QUETIAPINE FUMARATE 25 MILLIGRAM(S): 200 TABLET, FILM COATED ORAL at 21:16

## 2023-05-18 RX ADMIN — CEFEPIME 100 MILLIGRAM(S): 1 INJECTION, POWDER, FOR SOLUTION INTRAMUSCULAR; INTRAVENOUS at 13:18

## 2023-05-18 RX ADMIN — Medication 40 MILLIGRAM(S): at 05:37

## 2023-05-18 NOTE — PROGRESS NOTE ADULT - ASSESSMENT
77-year-old male with a history of bronchiectasis, iron deficiency anemia brought in by family for evaluation of shortness of breath x2 days. 77-year-old male with a history of bronchiectasis, iron deficiency anemia, and alzheimer's brought in by family for evaluation of shortness of breath x2 days. History obtained from patient's daughter at bedside. As per daughter, patient was admitted to [facility] in 08/2022 for similar complaints and was found to have Pneumonia there. Since then patient has had chronic cough and has been feeling weak. Patient had worsening cough for the past couple days and felt sudden shortness of breath and palpitations prompting the family to bring him to ED.  Patient had new productive cough with yellow sputum, chills, body aches and subjective fever.  Denies any runny nose, sore throat, sick contacts, recent travel, chest pain, abdominal pain, vomiting, diarrhea, leg pain and swelling.     #Viral pneumonia in a setting of h/o bronchiectasis  #Sepsis present on admission  - pt positive for entero/rhino virus on rvp   - pt is now satting well on RA  - s/p Azithromycin + Rocephin in the ED   - s/p Duonebs in the ED   - CXR: Left midlung opacities. Right mid to lower lung pleural plaques and opacities. No pneumothorax. No significant pleural effusion on frontal view. Stable cardiomediastinal silhouette  - CT chest: Right middle lobe right lower lobe, and left upper lobe bronchiectasis is present. Near-complete atelectasis of the right lower lobe. Scattered tree-in-bud nodules seen along the periphery of the right middle lobe. Mild tree-in-bud nodules are also seen in the superior segment of the left lower lobe (5/145). Findings are suspicious for infection. TB should be excluded. 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  - neg MRSA swab  -  procal - negative 0.08  - ID consulted :  Discussed with family member -- long standing history of bronchiectasis since childhood - last hospitalization in 7/2022 at [facility]; last treated in March 2023 for bronchiectasis flare with levofloxacin.  likely bronchiectasis flare, but at risk for MAC/ALLEN - no previous films to compare CT imaging . would try to reach out to his pulmonologist if worked up for NTM -- if not,  would check AFB cx x 3 for NTM work-up (low suspicion for Pulmonary tuberculosis). check quantiferon gold. continue cefepime 2g q 8 hours. continue doxycycline 100 mg BID for 5 days. follow-up blood cx. trend fever curve   - 5/19 to discuss with ID stopping antibiotics    #H/O Iron Deficiency anemia   - HgB 12.4   - .2  - Obtain iron studies   - f/u Vitamin B12 and Folate levels   - Monitor CBC for now     #H/O Alzheimer's   - cw home Seroquel 25 mg QD     #DVT PPx: Lovenox subq  #GI PPx: Pantoprazole  #Diet: Regular   #Activity: IAT  #Dispo: med floor    Pending : afb x 3, discussion on stopping antibiotics with ID

## 2023-05-18 NOTE — SWALLOW VFSS/MBS ASSESSMENT ADULT - DIAGNOSTIC IMPRESSIONS
Severe pharyngeal dysphagia with thin liquids as evidenced by deep penetration and aspiration during and after the swallow. Mild/moderate oropharyngeal dysphagia with mildly thick liquids, puree and soft & bite sized solids. Dysphagia is likely chronic and related to patient's h/o Alzheimer's.

## 2023-05-18 NOTE — SWALLOW VFSS/MBS ASSESSMENT ADULT - PHARYNGEAL PHASE COMMENTS
Pharyngeal impairments include: Partial superior movement of thyroid cartilage/partial approximation of arytenoids to epiglottic petiole, partial anterior hyoid excursion, incomplete laryngeal vestibular closure resulting in deep penetration of thin liquids via tsp during the swallow and aspiration (silent) of controlled and sequential sips of thin liquids. For thins via spoon scoring 5/8 Penetration/ aspiration (PAS) and scoring 8/8 PAS scale. Present, but diminished pharyngeal stripping wave, partial distention of PES segment, narrow column of contrast of air between tongue base and posterior pharyngeal wall. Collection of pharyngeal residue remaining in the valleculae and pyriform sinuses. Pt. noted with aspiration of remainder pharyngeal residue after the swallow. Pharyngeal impairments include: Partial superior movement of thyroid cartilage/partial approximation of arytenoids to epiglottic petiole, partial anterior hyoid excursion, incomplete laryngeal vestibular closure. Present, but diminished pharyngeal stripping wave, partial distention of PES segment, narrow column of contrast of air between tongue base and posterior pharyngeal wall. Collection of pharyngeal residue remaining in the valleculae and pyriform sinuses. Pt. noted with x1 instance of penetration with mildly thick liquids after the swallow during liquids wash strategy scoring a 3/8 in penetration/ aspiration scale (PAS).

## 2023-05-18 NOTE — PROGRESS NOTE ADULT - ASSESSMENT
77-year-old male with a history of bronchiectasis, iron deficiency anemia brought in by family for evaluation of shortness of breath x2 days.     PLAN    #Suspected gram negative pneumonia in a setting of h/o bronchiectasis  #Sepsis present on admission  - febrile with Tmax 102.2 F  - Leukocytosis with WBC of 12.68   - tachypneic with RR 34   - lactate 2.2  - hypoxic with spO2 of 88% on RA; was placed on BiPAP with improvement in spO2   - remains on 3 L of NC   - s/p Azithromycin + Rocephin in the ED   - s/p Duonebs in the ED   - CXR: opacity in the R lung ( pending official read)   - start vancomycin 750 mg q12h; check vanc trough before the 4th dose   - start Cefepime 2 g q12h + Levaquin  mg once daily   - f/u MRSA swab  - f/u RVP   - f/u procal   - f/u CT Chest Non contrast   - c/w Duonebs PRN   - c/w guaianesin 400 mg q4h PRN for cough    - ID consulted     #H/O Iron Deficiency anemia   - HgB 12.4   - .2  - Obtain iron studies   - f/u Vitamin B12 and Folate levels   - Monitor CBC for now     #H/O Alzheimer's   - cw home Seroquel 25 mg QD     #DVT PPx: Lovenox subq  #GI PPx: Pantoprazole  #Diet: Regular   #Activity: IAT  #Dispo: SDU 77-year-old male with a history of bronchiectasis, iron deficiency anemia brought in by family for evaluation of shortness of breath x2 days. 77-year-old male with a history of bronchiectasis, iron deficiency anemia, and alzheimer's brought in by family for evaluation of shortness of breath x2 days. History obtained from patient's daughter at bedside. As per daughter, patient was admitted to [facility] in 08/2022 for similar complaints and was found to have Pneumonia there. Since then patient has had chronic cough and has been feeling weak. Patient had worsening cough for the past couple days and felt sudden shortness of breath and palpitations prompting the family to bring him to ED.  Patient had new productive cough with yellow sputum, chills, body aches and subjective fever.  Denies any runny nose, sore throat, sick contacts, recent travel, chest pain, abdominal pain, vomiting, diarrhea, leg pain and swelling.     #Suspected gram negative pneumonia in a setting of h/o bronchiectasis  #Sepsis present on admission  - febrile with Tmax 102.2 F  - Leukocytosis with WBC of 12.68   - tachypneic with RR 34   - lactate 2.2  - hypoxic with spO2 of 88% on RA; was placed on BiPAP with improvement in spO2   - remains on 3 L of NC -- WILL WEAN OFF  - s/p Azithromycin + Rocephin in the ED   - s/p Duonebs in the ED   - CXR: Left midlung opacities. Right mid to lower lung pleural plaques and opacities. No pneumothorax. No significant pleural effusion on frontal view. Stable cardiomediastinal silhouette  - CT chest: Right middle lobe right lower lobe, and left upper lobe bronchiectasis is present. Near-complete atelectasis of the right lower lobe. Scattered tree-in-bud nodules seen along the periphery of the right middle lobe. Mild tree-in-bud nodules are also seen in the superior segment of the left lower lobe (5/145). Findings are suspicious for infection. TB should be excluded. 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  - f/u MRSA swab  - f/u RVP   - f/u procal     - ID consulted :  Discussed with family member -- long standing history of bronchiectasis since childhood - last hospitalization in 7/2022 at [facility]; last treated in March 2023 for bronchiectasis flare with levofloxacin.  likely bronchiectasis flare, but at risk for MAC/ALLEN - no previous films to compare CT imaging . would try to reach out to his pulmonologist if worked up for NTM -- if not,  would check AFB cx x 3 for NTM work-up (low suspicion for Pulmonary tuberculosis). check quantiferon gold. continue cefepime 2g q 8 hours. continue doxycycline 100 mg BID for 5 days. follow-up blood cx. trend fever curve       #H/O Iron Deficiency anemia   - HgB 12.4   - .2  - Obtain iron studies   - f/u Vitamin B12 and Folate levels   - Monitor CBC for now     #H/O Alzheimer's   - cw home Seroquel 25 mg QD     #DVT PPx: Lovenox subq  #GI PPx: Pantoprazole  #Diet: Regular   #Activity: IAT  #Dispo: SDU    Pending        77-year-old male with a history of bronchiectasis, iron deficiency anemia brought in by family for evaluation of shortness of breath x2 days. 77-year-old male with a history of bronchiectasis, iron deficiency anemia, and alzheimer's brought in by family for evaluation of shortness of breath x2 days. History obtained from patient's daughter at bedside. As per daughter, patient was admitted to [facility] in 08/2022 for similar complaints and was found to have Pneumonia there. Since then patient has had chronic cough and has been feeling weak. Patient had worsening cough for the past couple days and felt sudden shortness of breath and palpitations prompting the family to bring him to ED.  Patient had new productive cough with yellow sputum, chills, body aches and subjective fever.  Denies any runny nose, sore throat, sick contacts, recent travel, chest pain, abdominal pain, vomiting, diarrhea, leg pain and swelling.     #Suspected gram negative pneumonia in a setting of h/o bronchiectasis  #Sepsis present on admission  - febrile with Tmax 102.2 F  - Leukocytosis with WBC of 12.68   - tachypneic with RR 34   - lactate 2.2  - hypoxic with spO2 of 88% on RA; was placed on BiPAP with improvement in spO2   - remains on 3 L of NC -- WILL WEAN OFF  - s/p Azithromycin + Rocephin in the ED   - s/p Duonebs in the ED   - CXR: Left midlung opacities. Right mid to lower lung pleural plaques and opacities. No pneumothorax. No significant pleural effusion on frontal view. Stable cardiomediastinal silhouette  - CT chest: Right middle lobe right lower lobe, and left upper lobe bronchiectasis is present. Near-complete atelectasis of the right lower lobe. Scattered tree-in-bud nodules seen along the periphery of the right middle lobe. Mild tree-in-bud nodules are also seen in the superior segment of the left lower lobe (5/145). Findings are suspicious for infection. TB should be excluded. 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  - f/u MRSA swab  - f/u RVP   - f/u procal     - ID consulted :  Discussed with family member -- long standing history of bronchiectasis since childhood - last hospitalization in 7/2022 at [facility]; last treated in March 2023 for bronchiectasis flare with levofloxacin.  likely bronchiectasis flare, but at risk for MAC/ALLEN - no previous films to compare CT imaging . would try to reach out to his pulmonologist if worked up for NTM -- if not,  would check AFB cx x 3 for NTM work-up (low suspicion for Pulmonary tuberculosis). check quantiferon gold. continue cefepime 2g q 8 hours. continue doxycycline 100 mg BID for 5 days. follow-up blood cx. trend fever curve         #H/O Iron Deficiency anemia   - HgB 12.4   - .2  - Obtain iron studies   - f/u Vitamin B12 and Folate levels   - Monitor CBC for now     #H/O Alzheimer's   - cw home Seroquel 25 mg QD     #DVT PPx: Lovenox subq  #GI PPx: Pantoprazole  #Diet: Regular   #Activity: IAT  #Dispo: SDU    Pending : TB workup

## 2023-05-18 NOTE — SWALLOW VFSS/MBS ASSESSMENT ADULT - SLP GENERAL OBSERVATIONS
Pt. received in radiology suite, awake, alert, o2 NC. Accompanied by daughter who served as Cantonese/English .

## 2023-05-18 NOTE — SWALLOW VFSS/MBS ASSESSMENT ADULT - ORAL PHASE COMMENTS
Pt. presents with impaired tongue control with posterior escape of less than half of bolus, slowed prolonged chewing with complete recollection of soft solids, delayed initiation of tongue motion with trace residue lining in oral cavities. Initiation of pharyngeal swallow with bolus head in the valleculae. Pt. presents with impaired tongue control with posterior escape of less than half of bolus, delayed initiation of tongue motion with trace residue lining in oral cavities. Initiation of pharyngeal swallow with bolus head in the valleculae.

## 2023-05-18 NOTE — SWALLOW VFSS/MBS ASSESSMENT ADULT - ADDITIONAL RECOMMENDATIONS
SLP will plan to f/u to provide family education at bedside, use of mildly thick liquids and swallow strategies.

## 2023-05-18 NOTE — SWALLOW VFSS/MBS ASSESSMENT ADULT - SLP PERTINENT HISTORY OF CURRENT PROBLEM
77-year-old male with PMH of bronchiectasis, iron deficiency anemia, and alzheimer's. Brought in by family for evaluation of shortness of breath x2 days. As per daughter, patient was admitted to [facility] in 08/2022 for similar complaints and was found to have Pneumonia. CXR indicates R lung opacity.

## 2023-05-19 ENCOUNTER — TRANSCRIPTION ENCOUNTER (OUTPATIENT)
Age: 78
End: 2023-05-19

## 2023-05-19 LAB
ALBUMIN SERPL ELPH-MCNC: 4.2 G/DL — SIGNIFICANT CHANGE UP (ref 3.5–5.2)
ALP SERPL-CCNC: 67 U/L — SIGNIFICANT CHANGE UP (ref 30–115)
ALT FLD-CCNC: 16 U/L — SIGNIFICANT CHANGE UP (ref 0–41)
ANION GAP SERPL CALC-SCNC: 9 MMOL/L — SIGNIFICANT CHANGE UP (ref 7–14)
AST SERPL-CCNC: 23 U/L — SIGNIFICANT CHANGE UP (ref 0–41)
BASOPHILS # BLD AUTO: 0.05 K/UL — SIGNIFICANT CHANGE UP (ref 0–0.2)
BASOPHILS NFR BLD AUTO: 0.4 % — SIGNIFICANT CHANGE UP (ref 0–1)
BILIRUB SERPL-MCNC: 0.7 MG/DL — SIGNIFICANT CHANGE UP (ref 0.2–1.2)
BUN SERPL-MCNC: 19 MG/DL — SIGNIFICANT CHANGE UP (ref 10–20)
CALCIUM SERPL-MCNC: 9.9 MG/DL — SIGNIFICANT CHANGE UP (ref 8.4–10.4)
CHLORIDE SERPL-SCNC: 102 MMOL/L — SIGNIFICANT CHANGE UP (ref 98–110)
CO2 SERPL-SCNC: 26 MMOL/L — SIGNIFICANT CHANGE UP (ref 17–32)
CREAT SERPL-MCNC: 0.7 MG/DL — SIGNIFICANT CHANGE UP (ref 0.7–1.5)
CULTURE RESULTS: SIGNIFICANT CHANGE UP
EGFR: 94 ML/MIN/1.73M2 — SIGNIFICANT CHANGE UP
EOSINOPHIL # BLD AUTO: 0.16 K/UL — SIGNIFICANT CHANGE UP (ref 0–0.7)
EOSINOPHIL NFR BLD AUTO: 1.4 % — SIGNIFICANT CHANGE UP (ref 0–8)
FOLATE SERPL-MCNC: 7.7 NG/ML — SIGNIFICANT CHANGE UP
GLUCOSE BLDC GLUCOMTR-MCNC: 107 MG/DL — HIGH (ref 70–99)
GLUCOSE BLDC GLUCOMTR-MCNC: 117 MG/DL — HIGH (ref 70–99)
GLUCOSE BLDC GLUCOMTR-MCNC: 121 MG/DL — HIGH (ref 70–99)
GLUCOSE BLDC GLUCOMTR-MCNC: 163 MG/DL — HIGH (ref 70–99)
GLUCOSE BLDC GLUCOMTR-MCNC: 97 MG/DL — SIGNIFICANT CHANGE UP (ref 70–99)
GLUCOSE SERPL-MCNC: 101 MG/DL — HIGH (ref 70–99)
HCT VFR BLD CALC: 38.9 % — LOW (ref 42–52)
HGB BLD-MCNC: 13.1 G/DL — LOW (ref 14–18)
IMM GRANULOCYTES NFR BLD AUTO: 1.7 % — HIGH (ref 0.1–0.3)
LYMPHOCYTES # BLD AUTO: 0.44 K/UL — LOW (ref 1.2–3.4)
LYMPHOCYTES # BLD AUTO: 3.9 % — LOW (ref 20.5–51.1)
MAGNESIUM SERPL-MCNC: 2.2 MG/DL — SIGNIFICANT CHANGE UP (ref 1.8–2.4)
MCHC RBC-ENTMCNC: 33.4 PG — HIGH (ref 27–31)
MCHC RBC-ENTMCNC: 33.7 G/DL — SIGNIFICANT CHANGE UP (ref 32–37)
MCV RBC AUTO: 99.2 FL — HIGH (ref 80–94)
MONOCYTES # BLD AUTO: 0.27 K/UL — SIGNIFICANT CHANGE UP (ref 0.1–0.6)
MONOCYTES NFR BLD AUTO: 2.4 % — SIGNIFICANT CHANGE UP (ref 1.7–9.3)
NEUTROPHILS # BLD AUTO: 10.27 K/UL — HIGH (ref 1.4–6.5)
NEUTROPHILS NFR BLD AUTO: 90.2 % — HIGH (ref 42.2–75.2)
NIGHT BLUE STAIN TISS: SIGNIFICANT CHANGE UP
NRBC # BLD: 0 /100 WBCS — SIGNIFICANT CHANGE UP (ref 0–0)
PLATELET # BLD AUTO: 221 K/UL — SIGNIFICANT CHANGE UP (ref 130–400)
PMV BLD: 9.1 FL — SIGNIFICANT CHANGE UP (ref 7.4–10.4)
POTASSIUM SERPL-MCNC: 4.4 MMOL/L — SIGNIFICANT CHANGE UP (ref 3.5–5)
POTASSIUM SERPL-SCNC: 4.4 MMOL/L — SIGNIFICANT CHANGE UP (ref 3.5–5)
PROT SERPL-MCNC: 6.8 G/DL — SIGNIFICANT CHANGE UP (ref 6–8)
RBC # BLD: 3.92 M/UL — LOW (ref 4.7–6.1)
RBC # FLD: 11.9 % — SIGNIFICANT CHANGE UP (ref 11.5–14.5)
SODIUM SERPL-SCNC: 137 MMOL/L — SIGNIFICANT CHANGE UP (ref 135–146)
SPECIMEN SOURCE: SIGNIFICANT CHANGE UP
SPECIMEN SOURCE: SIGNIFICANT CHANGE UP
VIT B12 SERPL-MCNC: 637 PG/ML — SIGNIFICANT CHANGE UP (ref 232–1245)
WBC # BLD: 11.38 K/UL — HIGH (ref 4.8–10.8)
WBC # FLD AUTO: 11.38 K/UL — HIGH (ref 4.8–10.8)

## 2023-05-19 PROCEDURE — 99232 SBSQ HOSP IP/OBS MODERATE 35: CPT

## 2023-05-19 RX ADMIN — Medication 40 MILLIGRAM(S): at 05:14

## 2023-05-19 RX ADMIN — QUETIAPINE FUMARATE 25 MILLIGRAM(S): 200 TABLET, FILM COATED ORAL at 21:13

## 2023-05-19 RX ADMIN — CEFEPIME 100 MILLIGRAM(S): 1 INJECTION, POWDER, FOR SOLUTION INTRAMUSCULAR; INTRAVENOUS at 05:15

## 2023-05-19 RX ADMIN — Medication 100 MILLIGRAM(S): at 05:15

## 2023-05-19 RX ADMIN — ENOXAPARIN SODIUM 40 MILLIGRAM(S): 100 INJECTION SUBCUTANEOUS at 21:13

## 2023-05-19 NOTE — PROGRESS NOTE ADULT - ASSESSMENT
77-year-old male with a history of bronchiectasis, iron deficiency anemia brought in by family for evaluation of shortness of breath x2 days. 77-year-old male with a history of bronchiectasis, iron deficiency anemia, and alzheimer's brought in by family for evaluation of shortness of breath x2 days. History obtained from patient's daughter at bedside. As per daughter, patient was admitted to [facility] in 08/2022 for similar complaints and was found to have Pneumonia there. Since then patient has had chronic cough and has been feeling weak. Patient had worsening cough for the past couple days and felt sudden shortness of breath and palpitations prompting the family to bring him to ED.  Patient had new productive cough with yellow sputum, chills, body aches and subjective fever.  Denies any runny nose, sore throat, sick contacts, recent travel, chest pain, abdominal pain, vomiting, diarrhea, leg pain and swelling.     #Viral pneumonia in a setting of h/o bronchiectasis  #Sepsis present on admission  - pt positive for entero/rhino virus on rvp   - pt is now satting well on RA  - s/p Azithromycin + Rocephin in the ED   - s/p Duonebs in the ED   - CXR: Left midlung opacities. Right mid to lower lung pleural plaques and opacities. No pneumothorax. No significant pleural effusion on frontal view. Stable cardiomediastinal silhouette  - CT chest: Right middle lobe right lower lobe, and left upper lobe bronchiectasis is present. Near-complete atelectasis of the right lower lobe. Scattered tree-in-bud nodules seen along the periphery of the right middle lobe. Mild tree-in-bud nodules are also seen in the superior segment of the left lower lobe (5/145). Findings are suspicious for infection. TB should be excluded. 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  - neg MRSA swab  -  procal - negative 0.08  - 5/19 ID agreed to stopping antibiotics  - 3 afbs taken - ID will follow up on them as an outpatient     #H/O Iron Deficiency anemia   - HgB 12.4   - .2  - Monitor CBC for now     #H/O Alzheimer's   #Hospital acquired delirium  - reorientation, discharge as soon as possible, decrease stimuli  - cw home Seroquel 25 mg QD     #DVT PPx: Lovenox subq  #GI PPx: Pantoprazole  #Diet: Regular   #Activity: IAT  #Dispo: med floor    Pending : pt is ready for discharge, family doesn't want to take him home until 5/21, plan to start appeal 5/20 if they are not able to take him home

## 2023-05-19 NOTE — PROVIDER CONTACT NOTE (OTHER) - ASSESSMENT
pt extremely confused and jumping out of bed despite education, encouragement, reinforcement and minimal invasive interventions

## 2023-05-19 NOTE — PROGRESS NOTE ADULT - ASSESSMENT
77-year-old male with a history of bronchiectasis, iron deficiency anemia, and alzheimer's brought in by family for evaluation of shortness of breath x2 days    Impressions  #Sepsis present on admission (fever, WBC>12)  #Bronchiectasis exacerbation - Rhinovirus/Enterovirus positive  - CT Chest No Cont (05.17.23 @ 04:50): Motion artifact throughout the lungs limits evaluation. Right middle lobe right lower lobe, and left upper lobe bronchiectasis is  present. Near-complete atelectasis of the right lower lobe. Scattered tree-in-bud nodules seen along the periphery of the right   middle lobe. Mild tree-in-bud nodules are also seen in the superior  segment of the left lower lobe (5/145). Findings are suspicious for infection. TB should be excluded. 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  - sputum Cx 5/16 NG   - Procalciconin 0.08  #Alzheimer's dementia     Recommendations  - monitor off antibiotics as procalcitonin negative and RVP showing enterovirus/rhinovirus   - check AFB sputum cx x 3 for work-up of bronchiectasis and will follow as outpatient   - follow-up quantiferon gold    Please call or message on [employer] Teams if with any questions.  Spectra [phone].

## 2023-05-19 NOTE — DISCHARGE NOTE PROVIDER - NSDCQMPCI_CARD_ALL_CORE
Appointment scheduled 12/8/2020.    Please approve prescription.  
Pt called made her annual apt after meds were denied - can dr oropeza call her enough until her apt - she is beginning to feel ill without it   
No

## 2023-05-19 NOTE — DISCHARGE NOTE PROVIDER - CARE PROVIDER_API CALL
LUCY CALLAHAN  Internal Medicine  [address] [place] [address] [address] [place] [address], [place] [address]  Phone: ()-  Fax: ()-  Follow Up Time: 2 weeks   LUCY CALLAHAN  Internal Medicine  [address] [place] [address] [address] [place] [id number]  Phone: ()-  Fax: ()-  Follow Up Time: 2 weeks    Frankie Clark)  Internal Medicine  [address] [place] [id number]  Phone: [phone]  Fax: [phone]  Follow Up Time:

## 2023-05-19 NOTE — SWALLOW BEDSIDE ASSESSMENT ADULT - NS ASR SWALLOW FINDINGS DISCUS
RN and MD made aware./Physician/Nursing
PATRICK Fields MD made aware./Physician/Nursing/Patient/Family

## 2023-05-19 NOTE — SWALLOW BEDSIDE ASSESSMENT ADULT - SLP PERTINENT HISTORY OF CURRENT PROBLEM
77-year-old male with PMH of bronchiectasis, iron deficiency anemia, and alzheimer's. Brought in by family for evaluation of shortness of breath x2 days. As per daughter, patient was admitted to [facility] in 08/2022 for similar complaints and was found to have Pneumonia. CXR indicates R lung opacity.
77-year-old male with PMH of bronchiectasis, iron deficiency anemia, and alzheimer's. Brought in by family for evaluation of shortness of breath x2 days. As per daughter, patient was admitted to [facility] in 08/2022 for similar complaints and was found to have Pneumonia. CXR indicates R lung opacity.

## 2023-05-19 NOTE — SWALLOW BEDSIDE ASSESSMENT ADULT - CONSISTENCIES ADMINISTERED
4oz mildly thick, 3oz soft/mildly thick/soft & bite-sized
3oz water, 3oz mildly thick, 2oz puree, 2oz soft & bite/thin liquid/mildly thick/pureed/soft & bite-sized

## 2023-05-19 NOTE — CHART NOTE - NSCHARTNOTEFT_GEN_A_CORE
The patient has chronic Bronchiectasis. He will need nebulizer machine at home for delivery of inhaled bronchodilates i.e Albuterol

## 2023-05-19 NOTE — SWALLOW BEDSIDE ASSESSMENT ADULT - ADDITIONAL RECOMMENDATIONS
SLP provided extensive family education to patient's daughter regarding MBSS results, rationale for mildly thick liquids. Pt's daughter reports understanding and agreement.   No further ST f/u is warranted at this time.

## 2023-05-19 NOTE — DISCHARGE NOTE PROVIDER - HOSPITAL COURSE
77-year-old male with a history of bronchiectasis, iron deficiency anemia brought in by family for evaluation of shortness of breath x2 days. 77-year-old male with a history of bronchiectasis, iron deficiency anemia, and alzheimer's brought in by family for evaluation of shortness of breath x2 days. History obtained from patient's daughter at bedside. As per daughter, patient was admitted to [facility] in 08/2022 for similar complaints and was found to have Pneumonia there. Since then patient has had chronic cough and has been feeling weak. Patient had worsening cough for the past couple days and felt sudden shortness of breath and palpitations prompting the family to bring him to ED.  Patient had new productive cough with yellow sputum, chills, body aches and subjective fever.  Denies any runny nose, sore throat, sick contacts, recent travel, chest pain, abdominal pain, vomiting, diarrhea, leg pain and swelling.     #Suspected gram negative pneumonia in a setting of h/o bronchiectasis  #Sepsis present on admission  - febrile with Tmax 102.2 F  - Leukocytosis with WBC of 12.68   - tachypneic with RR 34   - lactate 2.2  - hypoxic with spO2 of 88% on RA; was placed on BiPAP with improvement in spO2   - remains on 3 L of NC -- WILL WEAN OFF  - s/p Azithromycin + Rocephin in the ED   - s/p Duonebs in the ED   - CXR: Left midlung opacities. Right mid to lower lung pleural plaques and opacities. No pneumothorax. No significant pleural effusion on frontal view. Stable cardiomediastinal silhouette  - CT chest: Right middle lobe right lower lobe, and left upper lobe bronchiectasis is present. Near-complete atelectasis of the right lower lobe. Scattered tree-in-bud nodules seen along the periphery of the right middle lobe. Mild tree-in-bud nodules are also seen in the superior segment of the left lower lobe (5/145). Findings are suspicious for infection. TB should be excluded. 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  - f/u MRSA swab  - f/u RVP   - f/u procal     - ID consulted :  Discussed with family member -- long standing history of bronchiectasis since childhood - last hospitalization in 7/2022 at [facility]; last treated in March 2023 for bronchiectasis flare with levofloxacin.  likely bronchiectasis flare, but at risk for MAC/ALLEN - no previous films to compare CT imaging . would try to reach out to his pulmonologist if worked up for NTM -- if not,  would check AFB cx x 3 for NTM work-up (low suspicion for Pulmonary tuberculosis). check quantiferon gold. continue cefepime 2g q 8 hours. continue doxycycline 100 mg BID for 5 days. follow-up blood cx. trend fever curve         #H/O Iron Deficiency anemia   - HgB 12.4   - .2  - Obtain iron studies   - f/u Vitamin B12 and Folate levels   - Monitor CBC for now     #H/O Alzheimer's   - cw home Seroquel 25 mg QD       Patient is medically stable for discharge 77-year-old male with a history of bronchiectasis, iron deficiency anemia brought in by family for evaluation of shortness of breath x2 days. 77-year-old male with a history of bronchiectasis, iron deficiency anemia, and alzheimer's brought in by family for evaluation of shortness of breath x2 days. History obtained from patient's daughter at bedside. As per daughter, patient was admitted to [facility] in 08/2022 for similar complaints and was found to have Pneumonia there. Since then patient has had chronic cough and has been feeling weak. Patient had worsening cough for the past couple days and felt sudden shortness of breath and palpitations prompting the family to bring him to ED.  Patient had new productive cough with yellow sputum, chills, body aches and subjective fever.  Denies any runny nose, sore throat, sick contacts, recent travel, chest pain, abdominal pain, vomiting, diarrhea, leg pain and swelling.     Patient was found to have viral pneumonia causing an exacerbation of his bronchiectasis.    He was initially treated with IV antibiotics for possible bacterial pneumonia but antibiotics stopped with a normal procalcitonin.   He was found positive for enterovirus/rhinovirus on rvp.  He was septic on admission 2/2 to viral pneumonia.  ID consult was done and they will follow up on three AFBs for concern of mycobacterium avium.     H/O Iron Deficiency anemia   - HgB 12.4   - .2  - vitamin b12-wnl folate wnl       Patient is medically stable for discharge 77-year-old male with a history of bronchiectasis, iron deficiency anemia brought in by family for evaluation of shortness of breath x2 days. 77-year-old male with a history of bronchiectasis, iron deficiency anemia, and alzheimer's brought in by family for evaluation of shortness of breath x2 days. History obtained from patient's daughter at bedside. As per daughter, patient was admitted to [facility] in 08/2022 for similar complaints and was found to have Pneumonia there. Since then patient has had chronic cough and has been feeling weak. Patient had worsening cough for the past couple days and felt sudden shortness of breath and palpitations prompting the family to bring him to ED.  Patient had new productive cough with yellow sputum, chills, body aches and subjective fever.  Denies any runny nose, sore throat, sick contacts, recent travel, chest pain, abdominal pain, vomiting, diarrhea, leg pain and swelling.     Patient was found to have viral pneumonia causing an exacerbation of his bronchiectasis.    He was initially treated with IV antibiotics for possible bacterial pneumonia but antibiotics stopped with a normal procalcitonin.   He was found positive for enterovirus/rhinovirus on rvp.  He was septic on admission 2/2 to viral pneumonia.  ID consult was done and they will follow up on three AFBs for concern of mycobacterium avium.     H/O Iron Deficiency anemia   - HgB 12.4   - .2  - vitamin b12-wnl folate wnl       Patient is medically stable for discharge Discharge home with home care

## 2023-05-19 NOTE — SWALLOW BEDSIDE ASSESSMENT ADULT - ORAL PHASE
w/ regular/Decreased anterior-posterior movement of the bolus/Delayed oral transit time
Within functional limits

## 2023-05-19 NOTE — SWALLOW BEDSIDE ASSESSMENT ADULT - NS SPL SWALLOW CLINIC TRIAL FT
Mild oral dysphagia with soft & bite sized, + tolerance for soft & bite sized solids with mildly thick liquids without overt s/s of penetration/ aspiration.
+ overt s/s of aspiration with thin liquids. Mild oral dysphagia with regular solids. + tolerance for puree, soft & bite sized with mildly thick liquids without overt s/s of penetration/ aspiration.

## 2023-05-19 NOTE — DISCHARGE NOTE PROVIDER - NSDCCPCAREPLAN_GEN_ALL_CORE_FT
PRINCIPAL DISCHARGE DIAGNOSIS  Diagnosis: Sepsis  Assessment and Plan of Treatment: you were admitted and managed here for sepsis.  Sepsis is a serious condition that occurs when the body overreacts to an infection. It is also called systemic inflammatory response syndrome (SIRS) with infection. An infection is usually caused by bacteria that attack the body. The body's defense system normally fights off infection within the affected body part. With sepsis, the body overreacts and causes symptoms to occur throughout the body. This leads to uncontrolled and widespread inflammation and clotting in small blood vessels. Blood flow to different body parts decreases and may lead to organ failure. Sepsis requires immediate treatment.  You were treated in the hospital with IV antibiotics, and your symptoms resolved.   Please seek immediate medical attention if you develop fever >100.4 F, feel dizzy, have nausea or vomiting, coughing blood, have sudden trouble breathing or chest pain, severe weakness, or your skin or lips are turning blue.      SECONDARY DISCHARGE DIAGNOSES  Diagnosis: Right lower lobe pneumonia  Assessment and Plan of Treatment:     Diagnosis: Bronchiectasis  Assessment and Plan of Treatment:      PRINCIPAL DISCHARGE DIAGNOSIS  Diagnosis: Sepsis  Assessment and Plan of Treatment: you were admitted and managed here for sepsis.  Sepsis is a serious condition that occurs when the body overreacts to an infection. It is also called systemic inflammatory response syndrome (SIRS) with infection. An infection is usually caused by bacteria that attack the body. The body's defense system normally fights off infection within the affected body part. With sepsis, the body overreacts and causes symptoms to occur throughout the body. This leads to uncontrolled and widespread inflammation and clotting in small blood vessels. Blood flow to different body parts decreases and may lead to organ failure. Sepsis requires immediate treatment.  You were treated in the hospital with IV antibiotics, and your symptoms resolved. You are being discharged home with 5 days of steroids and a nebulizer for your lung's health   Please seek immediate medical attention if you develop fever >100.4 F, feel dizzy, have nausea or vomiting, coughing blood, have sudden trouble breathing or chest pain, severe weakness, or your skin or lips are turning blue.      SECONDARY DISCHARGE DIAGNOSES  Diagnosis: Right lower lobe pneumonia  Assessment and Plan of Treatment:     Diagnosis: Bronchiectasis  Assessment and Plan of Treatment:

## 2023-05-19 NOTE — DISCHARGE NOTE PROVIDER - PROVIDER TOKENS
PROVIDER:[TOKEN:[[id number]],FOLLOWUP:[2 weeks]] PROVIDER:[TOKEN:[[id number]],FOLLOWUP:[2 weeks]],PROVIDER:[TOKEN:[[id number]]]

## 2023-05-19 NOTE — SWALLOW BEDSIDE ASSESSMENT ADULT - SWALLOW EVAL: DIAGNOSIS
Mild oral dysphagia with soft & bite sized, + tolerance for soft & bite sized solids with mildly thick liquids without overt s/s of penetration/ aspiration.

## 2023-05-19 NOTE — DISCHARGE NOTE PROVIDER - NSDCMRMEDTOKEN_GEN_ALL_CORE_FT
Albuterol (Eqv-ProAir HFA) 90 mcg/inh inhalation aerosol: 2 puff(s) inhaled every 6 hours as needed for  bronchospasm  guaiFENesin 400 mg oral tablet: 1 tab(s) orally every 4 hours as needed for  congestion  Seroquel 25 mg oral tablet: 1 tab(s) orally once a day (at bedtime)   Albuterol (Eqv-ProAir HFA) 90 mcg/inh inhalation aerosol: 2 puff(s) inhaled every 6 hours as needed for  bronchospasm  guaiFENesin 400 mg oral tablet: 1 tab(s) orally every 4 hours as needed for  congestion  predniSONE 20 mg oral tablet: 4 tab(s) orally once a day  Seroquel 25 mg oral tablet: 1 tab(s) orally once a day (at bedtime)   Albuterol (Eqv-ProAir HFA) 90 mcg/inh inhalation aerosol: 2 puff(s) inhaled every 6 hours as needed for  bronchospasm  guaiFENesin 400 mg oral tablet: 1 tab(s) orally every 4 hours as needed for  congestion  ipratropium-albuterol 0.5 mg-2.5 mg/3 mL inhalation solution: 3 milliliter(s) inhaled every 6 hours as needed for Shortness of Breath and/or Wheezing  predniSONE 10 mg oral tablet: 4 tab(s) orally once a day  Seroquel 25 mg oral tablet: 1 tab(s) orally once a day (at bedtime)

## 2023-05-19 NOTE — SWALLOW BEDSIDE ASSESSMENT ADULT - COMMENTS
Pt. is s/p MBSS 5/18/23. Results revealed severe pharyngeal dysphagia with thin liquids as evidenced by deep penetration and aspiration during and after the swallow. Mild/moderate oropharyngeal dysphagia with mildly thick liquids, puree and soft & bite sized solids.   SLP recommends a Soft & bite sized diet w/ mildly thick liquids.     Pt's daughter served as Cantonese/ English .
Pt's daughter served as Cantonese/ English . Pt's daughter reports occasional swallowing difficulties with liquids and recent 4Lbs weight loss.

## 2023-05-19 NOTE — DISCHARGE NOTE PROVIDER - ATTENDING DISCHARGE PHYSICAL EXAMINATION:
Vital Signs Last 24 Hrs  T(F): 93.3 (19 May 2023 16:17), Max: 98.3 (19 May 2023 00:32)  HR: 99 (19 May 2023 16:17) (88 - 99)  BP: 139/68 (19 May 2023 16:17) (139/68 - 181/81)  RR: 18 (19 May 2023 16:17) (18 - 18)  SpO2: 95% (19 May 2023 16:17) (94% - 95%)    PHYSICAL EXAM:  GENERAL: NAD, well-groomed, well-developed  HEAD:  Atraumatic, Normocephalic  EYES: EOMI, conjunctiva and sclera clear  ENMT: Moist mucous membranes, Good dentition, no thrush  NECK: Supple, No JVD  CHEST/LUNG: scattered rhonchi, no wheezing   HEART: RRR; S1/S2, No murmur  ABDOMEN: Soft, Nontender, Nondistended; Bowel sounds present  VASCULAR: Normal pulses, Normal capillary refill  EXTREMITIES: No calf tenderness, No cyanosis, No edema  LYMPH: Normal; No lymphadenopathy noted  SKIN: Warm, Intact  PSYCH: Normal mood, Normal affect  NERVOUS SYSTEM:  A/O x1, poor concentration; not following directions

## 2023-05-19 NOTE — SWALLOW BEDSIDE ASSESSMENT ADULT - SLP GENERAL OBSERVATIONS
Pt. received at bedside, awake, ox2, +generalized weakness, 3L NC. Accompanied by daughter at bedside
Pt. received awake, alert, o2 NC +generalized weakness. Accompanied by daughter at bedside.

## 2023-05-19 NOTE — PROGRESS NOTE ADULT - ASSESSMENT
77-year-old male with a history of bronchiectasis, iron deficiency anemia brought in by family for evaluation of shortness of breath x2 days. 77-year-old male with a history of bronchiectasis, iron deficiency anemia, and alzheimer's brought in by family for evaluation of shortness of breath x2 days. History obtained from patient's daughter at bedside. As per daughter, patient was admitted to [facility] in 08/2022 for similar complaints and was found to have Pneumonia there. Since then patient has had chronic cough and has been feeling weak. Patient had worsening cough for the past couple days and felt sudden shortness of breath and palpitations prompting the family to bring him to ED.  Patient had new productive cough with yellow sputum, chills, body aches and subjective fever.  Denies any runny nose, sore throat, sick contacts, recent travel, chest pain, abdominal pain, vomiting, diarrhea, leg pain and swelling.     #Suspected gram negative pneumonia in a setting of h/o bronchiectasis  #Sepsis present on admission  - febrile with Tmax 102.2 F  - Leukocytosis with WBC of 12.68   - tachypneic with RR 34   - lactate 2.2  - hypoxic with spO2 of 88% on RA; was placed on BiPAP with improvement in spO2   - remains on 3 L of NC -- WILL WEAN OFF  - s/p Azithromycin + Rocephin in the ED   - s/p Duonebs in the ED   - CXR: Left midlung opacities. Right mid to lower lung pleural plaques and opacities. No pneumothorax. No significant pleural effusion on frontal view. Stable cardiomediastinal silhouette  - CT chest: Right middle lobe right lower lobe, and left upper lobe bronchiectasis is present. Near-complete atelectasis of the right lower lobe. Scattered tree-in-bud nodules seen along the periphery of the right middle lobe. Mild tree-in-bud nodules are also seen in the superior segment of the left lower lobe (5/145). Findings are suspicious for infection. TB should be excluded. 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  - f/u MRSA swab  - f/u RVP   - f/u procal     - ID consulted :  Discussed with family member -- long standing history of bronchiectasis since childhood - last hospitalization in 7/2022 at [facility]; last treated in March 2023 for bronchiectasis flare with levofloxacin.  likely bronchiectasis flare, but at risk for MAC/ALLEN - no previous films to compare CT imaging . would try to reach out to his pulmonologist if worked up for NTM -- if not,  would check AFB cx x 3 for NTM work-up (low suspicion for Pulmonary tuberculosis). check quantiferon gold. continue cefepime 2g q 8 hours. continue doxycycline 100 mg BID for 5 days. follow-up blood cx. trend fever curve         #H/O Iron Deficiency anemia   - HgB 12.4   - .2  - Obtain iron studies   - f/u Vitamin B12 and Folate levels   - Monitor CBC for now     #H/O Alzheimer's   - cw home Seroquel 25 mg QD     #DVT PPx: Lovenox subq  #GI PPx: Pantoprazole  #Diet: Regular   #Activity: IAT  #Dispo: SDU     77-year-old male with a history of bronchiectasis, iron deficiency anemia brought in by family for evaluation of shortness of breath x2 days. 77-year-old male with a history of bronchiectasis, iron deficiency anemia, and alzheimer's brought in by family for evaluation of shortness of breath x2 days. History obtained from patient's daughter at bedside. As per daughter, patient was admitted to [facility] in 08/2022 for similar complaints and was found to have Pneumonia there. Since then patient has had chronic cough and has been feeling weak. Patient had worsening cough for the past couple days and felt sudden shortness of breath and palpitations prompting the family to bring him to ED.  Patient had new productive cough with yellow sputum, chills, body aches and subjective fever.  Denies any runny nose, sore throat, sick contacts, recent travel, chest pain, abdominal pain, vomiting, diarrhea, leg pain and swelling.     #Suspected gram negative pneumonia in a setting of h/o bronchiectasis  #Sepsis present on admission  - febrile with Tmax 102.2 F  - Leukocytosis with WBC of 12.68   - tachypneic with RR 34   - lactate 2.2  - hypoxic with spO2 of 88% on RA; was placed on BiPAP with improvement in spO2   - remains on 3 L of NC -- WILL WEAN OFF  - s/p Azithromycin + Rocephin in the ED   - s/p Duonebs in the ED   - CXR: Left midlung opacities. Right mid to lower lung pleural plaques and opacities. No pneumothorax. No significant pleural effusion on frontal view. Stable cardiomediastinal silhouette  - CT chest: Right middle lobe right lower lobe, and left upper lobe bronchiectasis is present. Near-complete atelectasis of the right lower lobe. Scattered tree-in-bud nodules seen along the periphery of the right middle lobe. Mild tree-in-bud nodules are also seen in the superior segment of the left lower lobe (5/145). Findings are suspicious for infection. TB should be excluded. 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  - f/u MRSA swab  - f/u RVP   - f/u procal     - ID consulted :  Discussed with family member -- long standing history of bronchiectasis since childhood - last hospitalization in 7/2022 at [facility]; last treated in March 2023 for bronchiectasis flare with levofloxacin.  likely bronchiectasis flare, but at risk for MAC/ALLEN - no previous films to compare CT imaging . would try to reach out to his pulmonologist if worked up for NTM -- if not,  would check AFB cx x 3 for NTM work-up (low suspicion for Pulmonary tuberculosis). check quantiferon gold. continue cefepime 2g q 8 hours. continue doxycycline 100 mg BID for 5 days. follow-up blood cx. trend fever curve     #H/O Iron Deficiency anemia   - HgB 12.4   - .2  - Obtain iron studies   - f/u Vitamin B12 and Folate levels   - Monitor CBC for now     #H/O Alzheimer's   - cw home Seroquel 25 mg QD     #DVT PPx: Lovenox subq  #GI PPx: Pantoprazole  #Diet: Regular   #Activity: IAT  #Dispo: SDU      pending: discharge

## 2023-05-20 ENCOUNTER — TRANSCRIPTION ENCOUNTER (OUTPATIENT)
Age: 78
End: 2023-05-20

## 2023-05-20 VITALS — TEMPERATURE: 99 F | HEART RATE: 94 BPM | SYSTOLIC BLOOD PRESSURE: 141 MMHG | DIASTOLIC BLOOD PRESSURE: 71 MMHG

## 2023-05-20 LAB
ALBUMIN SERPL ELPH-MCNC: 4.1 G/DL — SIGNIFICANT CHANGE UP (ref 3.5–5.2)
ALP SERPL-CCNC: 61 U/L — SIGNIFICANT CHANGE UP (ref 30–115)
ALT FLD-CCNC: 15 U/L — SIGNIFICANT CHANGE UP (ref 0–41)
ANION GAP SERPL CALC-SCNC: 14 MMOL/L — SIGNIFICANT CHANGE UP (ref 7–14)
AST SERPL-CCNC: 26 U/L — SIGNIFICANT CHANGE UP (ref 0–41)
BASOPHILS # BLD AUTO: 0.05 K/UL — SIGNIFICANT CHANGE UP (ref 0–0.2)
BASOPHILS NFR BLD AUTO: 0.5 % — SIGNIFICANT CHANGE UP (ref 0–1)
BILIRUB SERPL-MCNC: 0.8 MG/DL — SIGNIFICANT CHANGE UP (ref 0.2–1.2)
BUN SERPL-MCNC: 23 MG/DL — HIGH (ref 10–20)
CALCIUM SERPL-MCNC: 9.2 MG/DL — SIGNIFICANT CHANGE UP (ref 8.4–10.5)
CHLORIDE SERPL-SCNC: 107 MMOL/L — SIGNIFICANT CHANGE UP (ref 98–110)
CO2 SERPL-SCNC: 23 MMOL/L — SIGNIFICANT CHANGE UP (ref 17–32)
CREAT SERPL-MCNC: 0.8 MG/DL — SIGNIFICANT CHANGE UP (ref 0.7–1.5)
EGFR: 91 ML/MIN/1.73M2 — SIGNIFICANT CHANGE UP
EOSINOPHIL # BLD AUTO: 0.07 K/UL — SIGNIFICANT CHANGE UP (ref 0–0.7)
EOSINOPHIL NFR BLD AUTO: 0.7 % — SIGNIFICANT CHANGE UP (ref 0–8)
GAMMA INTERFERON BACKGROUND BLD IA-ACNC: 0.01 IU/ML — SIGNIFICANT CHANGE UP
GLUCOSE SERPL-MCNC: 90 MG/DL — SIGNIFICANT CHANGE UP (ref 70–99)
HCT VFR BLD CALC: 38.2 % — LOW (ref 42–52)
HGB BLD-MCNC: 12.9 G/DL — LOW (ref 14–18)
IMM GRANULOCYTES NFR BLD AUTO: 1.8 % — HIGH (ref 0.1–0.3)
LYMPHOCYTES # BLD AUTO: 0.63 K/UL — LOW (ref 1.2–3.4)
LYMPHOCYTES # BLD AUTO: 6.3 % — LOW (ref 20.5–51.1)
M TB IFN-G BLD-IMP: ABNORMAL
M TB IFN-G CD4+ BCKGRND COR BLD-ACNC: 0 IU/ML — SIGNIFICANT CHANGE UP
M TB IFN-G CD4+CD8+ BCKGRND COR BLD-ACNC: 0 IU/ML — SIGNIFICANT CHANGE UP
MAGNESIUM SERPL-MCNC: 2.4 MG/DL — SIGNIFICANT CHANGE UP (ref 1.8–2.4)
MCHC RBC-ENTMCNC: 33.8 G/DL — SIGNIFICANT CHANGE UP (ref 32–37)
MCHC RBC-ENTMCNC: 33.8 PG — HIGH (ref 27–31)
MCV RBC AUTO: 100 FL — HIGH (ref 80–94)
MONOCYTES # BLD AUTO: 0.21 K/UL — SIGNIFICANT CHANGE UP (ref 0.1–0.6)
MONOCYTES NFR BLD AUTO: 2.1 % — SIGNIFICANT CHANGE UP (ref 1.7–9.3)
NEUTROPHILS # BLD AUTO: 8.81 K/UL — HIGH (ref 1.4–6.5)
NEUTROPHILS NFR BLD AUTO: 88.6 % — HIGH (ref 42.2–75.2)
NIGHT BLUE STAIN TISS: SIGNIFICANT CHANGE UP
NIGHT BLUE STAIN TISS: SIGNIFICANT CHANGE UP
NRBC # BLD: 0 /100 WBCS — SIGNIFICANT CHANGE UP (ref 0–0)
PLATELET # BLD AUTO: 197 K/UL — SIGNIFICANT CHANGE UP (ref 130–400)
PMV BLD: 9.1 FL — SIGNIFICANT CHANGE UP (ref 7.4–10.4)
POTASSIUM SERPL-MCNC: 4.6 MMOL/L — SIGNIFICANT CHANGE UP (ref 3.5–5)
POTASSIUM SERPL-SCNC: 4.6 MMOL/L — SIGNIFICANT CHANGE UP (ref 3.5–5)
PROT SERPL-MCNC: 6.4 G/DL — SIGNIFICANT CHANGE UP (ref 6–8)
QUANT TB PLUS MITOGEN MINUS NIL: 0.11 IU/ML — SIGNIFICANT CHANGE UP
RBC # BLD: 3.82 M/UL — LOW (ref 4.7–6.1)
RBC # FLD: 12.2 % — SIGNIFICANT CHANGE UP (ref 11.5–14.5)
SODIUM SERPL-SCNC: 144 MMOL/L — SIGNIFICANT CHANGE UP (ref 135–146)
SPECIMEN SOURCE: SIGNIFICANT CHANGE UP
SPECIMEN SOURCE: SIGNIFICANT CHANGE UP
WBC # BLD: 9.95 K/UL — SIGNIFICANT CHANGE UP (ref 4.8–10.8)
WBC # FLD AUTO: 9.95 K/UL — SIGNIFICANT CHANGE UP (ref 4.8–10.8)

## 2023-05-20 PROCEDURE — 99239 HOSP IP/OBS DSCHRG MGMT >30: CPT

## 2023-05-20 RX ORDER — IPRATROPIUM/ALBUTEROL SULFATE 18-103MCG
3 AEROSOL WITH ADAPTER (GRAM) INHALATION
Qty: 170 | Refills: 0
Start: 2023-05-20 | End: 2023-06-02

## 2023-05-20 RX ADMIN — Medication 40 MILLIGRAM(S): at 05:10

## 2023-05-20 NOTE — DISCHARGE NOTE NURSING/CASE MANAGEMENT/SOCIAL WORK - NSDCPEFALRISK_GEN_ALL_CORE
For information on Fall & Injury Prevention, visit: [email or web address] OR  [email or web address] OR  https://www.cdc.gov/steadi/patient.html

## 2023-05-20 NOTE — PROGRESS NOTE ADULT - SUBJECTIVE AND OBJECTIVE BOX
24H events:    Patient is a 77y old Male who presents with a chief complaint of Dyspnea (19 May 2023 19:04)    Primary diagnosis of Sepsis       Today is hospital day 4d.      Patient seen and examined at bedside. Reports no active complaints.     PAST MEDICAL & SURGICAL HISTORY  H/O bronchiectasis    Alzheimer's dementia    H/O iron deficiency anemia    History of lung surgery      SOCIAL HISTORY:  Negative for smoking/alcohol/drug use.     ALLERGIES:  No Known Allergies    MEDICATIONS:  STANDING MEDICATIONS  enoxaparin Injectable 40 milliGRAM(s) SubCutaneous every 24 hours  methylPREDNISolone sodium succinate Injectable 40 milliGRAM(s) IV Push daily  QUEtiapine 25 milliGRAM(s) Oral at bedtime    PRN MEDICATIONS  albuterol/ipratropium for Nebulization 3 milliLiter(s) Nebulizer every 6 hours PRN  melatonin 3 milliGRAM(s) Oral at bedtime PRN    VITALS:   T(F): 99.1  HR: 94  BP: 141/71  RR: 18  SpO2: 97%    LABS:                        12.9   9.95  )-----------( 197      ( 20 May 2023 07:26 )             38.2     05-20    144  |  107  |  23<H>  ----------------------------<  90  4.6   |  23  |  0.8    Ca    9.2      20 May 2023 07:26  Mg     2.4     05-20    TPro  6.4  /  Alb  4.1  /  TBili  0.8  /  DBili  x   /  AST  26  /  ALT  15  /  AlkPhos  61  05-20              Culture - Acid Fast - Sputum w/Smear (collected 19 May 2023 15:44)  Source: .Sputum Sputum    Culture - Acid Fast - Sputum w/Smear (collected 19 May 2023 06:00)  Source: .Sputum Sputum          RADIOLOGY:    PHYSICAL EXAM:  CONSTITUTIONAL: Well groomed, no apparent distress  EYES: PERRLA and symmetric, EOMI, No conjunctival or scleral injection, non-icteric  ENMT: Oral mucosa with moist membranes. No external nasal lesions; nasal mucosa not inflamed; normal dentition; no pharyngeal injection or exudates  NECK: Supple, symmetric and without tracheal deviation; thyroid gland not enlarged and without palpable masses  RESPIRATORY: Scattered wheezing bilateral lungs; decreased bs on inspiration on the R middle lobe area   CARDIOVASCULAR: RRRR, +S1S2, no murmurs  GASTROINTESTINAL: Soft, non tender, non distended, no rebound, no guarding  MUSCULOSKELETAL: generalized weakness +   SKIN: No rashes or ulcers noted; no subcutaneous nodules or induration palpable  NEUROLOGIC: No focal deficit   PSYCHIATRIC: Appropriate insight/judgment; A+O x 3, mood and affect appropriate, recent/remote memory intact    
JENNA MARCIAL  77y, Male  Allergy: No Known Allergies      LOS  3d    CHIEF COMPLAINT: Dyspnea (19 May 2023 11:11)      INTERVAL EVENTS/HPI  - No acute events overnight  - T(F): , Max: 98.3 (05-18-23 @ 15:38)  - Denies any worsening symptoms  - Tolerating medication  - WBC Count: 11.38 (05-19-23 @ 07:20)  WBC Count: 6.92 (05-18-23 @ 08:43)     - Creatinine, Serum: 0.7 (05-19-23 @ 07:20)  Creatinine, Serum: 0.7 (05-18-23 @ 08:43)       ROS  General: Denies rigors, nightsweats  HEENT: Denies headache, rhinorrhea, sore throat, eye pain  CV: Denies CP, palpitations  PULM: Denies wheezing, hemoptysis  GI: Denies hematemesis, hematochezia, melena  : Denies discharge, hematuria  MSK: Denies arthralgias, myalgias  SKIN: Denies rash, lesions  NEURO: Denies paresthesias, weakness  PSYCH: Denies depression, anxiety    VITALS:  T(F): 97.3, Max: 98.3 (05-18-23 @ 15:38)  HR: 88  BP: 181/81  RR: 18Vital Signs Last 24 Hrs  T(C): 36.3 (19 May 2023 08:31), Max: 36.8 (18 May 2023 15:38)  T(F): 97.3 (19 May 2023 08:31), Max: 98.3 (18 May 2023 15:38)  HR: 88 (19 May 2023 08:31) (82 - 89)  BP: 181/81 (19 May 2023 08:31) (141/71 - 181/81)  BP(mean): --  RR: 18 (19 May 2023 08:31) (18 - 18)  SpO2: 94% (19 May 2023 00:32) (94% - 96%)    Parameters below as of 19 May 2023 00:32  Patient On (Oxygen Delivery Method): room air        PHYSICAL EXAM:  Gen: NAD, resting in bed  HEENT: Normocephalic, atraumatic  Neck: supple, no lymphadenopathy  CV: Regular rate & regular rhythm  Lungs: decreased BS at bases, no fremitus  Abdomen: Soft, BS present  Ext: Warm, well perfused  Neuro: non focal, awake  Skin: no rash, no erythema  Lines: no phlebitis    FH: Non-contributory  Social Hx: Non-contributory    TESTS & MEASUREMENTS:                        13.1   11.38 )-----------( 221      ( 19 May 2023 07:20 )             38.9     05-19    137  |  102  |  19  ----------------------------<  101<H>  4.4   |  26  |  0.7    Ca    9.9      19 May 2023 07:20  Mg     2.2     05-19    TPro  6.8  /  Alb  4.2  /  TBili  0.7  /  DBili  x   /  AST  23  /  ALT  16  /  AlkPhos  67  05-19      LIVER FUNCTIONS - ( 19 May 2023 07:20 )  Alb: 4.2 g/dL / Pro: 6.8 g/dL / ALK PHOS: 67 U/L / ALT: 16 U/L / AST: 23 U/L / GGT: x               Culture - Sputum (collected 05-17-23 @ 11:20)  Source: .Sputum Sputum  Gram Stain (05-18-23 @ 07:38):    Few polymorphonuclear leukocytes per low power field    Rare Squamous epithelial cells per low power field    Rare Gram positive cocci in pairs seen per oil power field    Rare Gram Negative Rods seen per oil power field  Preliminary Report (05-18-23 @ 19:23):    Normal Respiratory Doreen present    Culture - Blood (collected 05-16-23 @ 19:45)  Source: .Blood Blood  Preliminary Report (05-18-23 @ 02:02):    No growth to date.    Culture - Blood (collected 05-16-23 @ 19:45)  Source: .Blood Blood  Preliminary Report (05-18-23 @ 02:02):    No growth to date.        Lactate, Blood: 1.2 mmol/L (05-17-23 @ 06:55)  Blood Gas Venous - Lactate: 2.20 mmol/L (05-16-23 @ 20:07)      INFECTIOUS DISEASES TESTING  MRSA PCR Result.: Negative (05-17-23 @ 09:03)  Rapid RVP Result: Detected (05-17-23 @ 09:03)  Procalcitonin, Serum: 0.08 (05-17-23 @ 06:55)      INFLAMMATORY MARKERS      RADIOLOGY & ADDITIONAL TESTS:  I have personally reviewed the last available Chest xray  CXR      CT  CT Chest No Cont:   ACC: [id number] EXAM:  CT CHEST   ORDERED BY: ESPERANZA SALEHA     PROCEDURE DATE:  05/17/2023          INTERPRETATION:  CLINICAL HISTORY: Pneumonia, bronchiectasis.    Technique: Contiguous axial CT images were obtained from the thoracic   inlet to the upper abdomen, without the administration of intravenous   contrast. Reconstructions in the coronal and sagittal planes were also   acquired. MIP reformats.    COMPARISON: None.    FINDINGS: Motion artifact throughout the lungs limits evaluation.    LUNGS, PLEURA, AIRWAYS: The central airways are patent. Bilateral   calcified pleural plaques present. Right middle lobe right lower lobe,   and left upper lobe bronchiectasis is present. Near-complete atelectasis   of the right lower lobe. Minimal left basilar atelectasis. No pleural   effusion or pneumothorax. No honeycombing.    PULMONARY NODULES: Scattered tree-in-bud nodules seen along the periphery   of the right middle lobe. Mild tree-in-bud nodules are also seen in the   superior segment of the left lower lobe (5/145).    THORACIC NODES: No mediastinal, hilar, or axillary lymphadenopathy.    MEDIASTINUM/GREAT VESSELS: No pericardial effusion. Heart size is within   normal limits. The aorta and main pulmonary artery are of normal caliber.    PARTIALLY IMAGED ABDOMEN: Left renal cyst, partially imaged.    BONES/SOFT TISSUES: Osteopenia.      IMPRESSION:    Motion artifact throughout the lungs limits evaluation.    Right middle lobe right lower lobe, and left upper lobe bronchiectasis is   present. Near-complete atelectasis of the right lower lobe.    Scattered tree-in-bud nodules seen along the periphery of the right   middle lobe. Mild tree-in-bud nodules are also seen in the superior   segment of the left lower lobe (5/145).    Findings are suspicious for infection. TB should be excluded.    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      --- End of Report ---            ANURADHA CARDOSO MD; Attending Radiologist  This document has been electronically signed. May 17 2023  1:20PM (05-17-23 @ 04:50)      CARDIOLOGY TESTING  12 Lead ECG:   Ventricular Rate 91 BPM    Atrial Rate 91 BPM    P-R Interval 160 ms    QRS Duration 84 ms    Q-T Interval 348 ms    QTC Calculation(Bazett) 428 ms    P Axis 70 degrees    R Axis 45 degrees    T Axis 68 degrees    Diagnosis Line Normal sinus rhythm  Normal ECG    Confirmed by WARCHOL  MD, DEYA ([id number]) on 5/17/2023 6:54:25 AM (05-17-23 @ 02:42)  12 Lead ECG:   Ventricular Rate 96 BPM    Atrial Rate 96 BPM    P-R Interval 162 ms    QRS Duration 84 ms    Q-T Interval 354 ms    QTC Calculation(Bazett) 447 ms    P Axis 69 degrees    R Axis 46 degrees    T Axis 67 degrees    Diagnosis Line Normal sinus rhythm  Normal ECG    Confirmed by DEYA AGUIRRE MD ([id number]) on 5/17/2023 6:54:13 AM (05-16-23 @ 22:49)      MEDICATIONS  enoxaparin Injectable 40 SubCutaneous every 24 hours  methylPREDNISolone sodium succinate Injectable 40 IV Push daily  QUEtiapine 25 Oral at bedtime      WEIGHT  Weight (kg): 63.5 (05-16-23 @ 18:02)  Creatinine, Serum: 0.7 mg/dL (05-19-23 @ 07:20)      ANTIBIOTICS:      All available historical records have been reviewed      
24H events:    Patient is a 77y old Male who presents with a chief complaint of Dyspnea (17 May 2023 12:01)    Primary diagnosis of Sepsis       Today is hospital day 2d.      Patient seen and examined at bedside. Reports no active complaints.     PAST MEDICAL & SURGICAL HISTORY  H/O bronchiectasis    Alzheimer's dementia    H/O iron deficiency anemia    History of lung surgery      SOCIAL HISTORY:  Negative for smoking/alcohol/drug use.     ALLERGIES:  No Known Allergies    MEDICATIONS:  STANDING MEDICATIONS  cefepime   IVPB 2000 milliGRAM(s) IV Intermittent every 8 hours  doxycycline IVPB 100 milliGRAM(s) IV Intermittent every 12 hours  enoxaparin Injectable 40 milliGRAM(s) SubCutaneous every 24 hours  methylPREDNISolone sodium succinate Injectable 40 milliGRAM(s) IV Push daily  QUEtiapine 25 milliGRAM(s) Oral at bedtime    PRN MEDICATIONS  albuterol/ipratropium for Nebulization 3 milliLiter(s) Nebulizer every 6 hours PRN  melatonin 3 milliGRAM(s) Oral at bedtime PRN    VITALS:   T(F): 96.5  HR: 82  BP: 164/79  RR: 18  SpO2: 96%    LABS:                        13.0   6.92  )-----------( 166      ( 18 May 2023 08:43 )             38.7     05-18    135  |  100  |  11  ----------------------------<  200<H>  3.9   |  26  |  0.7    Ca    9.0      18 May 2023 08:43  Mg     2.2     05-18    TPro  6.4  /  Alb  4.0  /  TBili  0.9  /  DBili  x   /  AST  16  /  ALT  13  /  AlkPhos  68  05-18              Culture - Sputum (collected 17 May 2023 11:20)  Source: .Sputum Sputum  Gram Stain (18 May 2023 07:38):    Few polymorphonuclear leukocytes per low power field    Rare Squamous epithelial cells per low power field    Rare Gram positive cocci in pairs seen per oil power field    Rare Gram Negative Rods seen per oil power field    Culture - Blood (collected 16 May 2023 19:45)  Source: .Blood Blood  Preliminary Report (18 May 2023 02:02):    No growth to date.    Culture - Blood (collected 16 May 2023 19:45)  Source: .Blood Blood  Preliminary Report (18 May 2023 02:02):    No growth to date.      CARDIAC MARKERS ( 16 May 2023 19:45 )  x     / <0.01 ng/mL / x     / x     / x          RADIOLOGY:    PHYSICAL EXAM:  CONSTITUTIONAL: Well groomed, no apparent distress  EYES: PERRLA and symmetric, EOMI, No conjunctival or scleral injection, non-icteric  ENMT: Oral mucosa with moist membranes. No external nasal lesions; nasal mucosa not inflamed; normal dentition; no pharyngeal injection or exudates  NECK: Supple, symmetric and without tracheal deviation; thyroid gland not enlarged and without palpable masses  RESPIRATORY: Scattered wheezing bilateral lungs; decreased bs on inspiration on the R middle lobe area   CARDIOVASCULAR: RRRR, +S1S2, no murmurs  GASTROINTESTINAL: Soft, non tender, non distended, no rebound, no guarding  MUSCULOSKELETAL: generalized weakness +   SKIN: No rashes or ulcers noted; no subcutaneous nodules or induration palpable  NEUROLOGIC: No focal deficit   PSYCHIATRIC: Appropriate insight/judgment; A+O x 3, mood and affect appropriate, recent/remote memory intact    
24H events:    Patient is a 77y old Male who presents with a chief complaint of Dyspnea (19 May 2023 12:42)    Primary diagnosis of Sepsis       Today is hospital day 3d.      Patient seen and examined at bedside. Reports no active complaints.     PAST MEDICAL & SURGICAL HISTORY  H/O bronchiectasis    Alzheimer's dementia    H/O iron deficiency anemia    History of lung surgery      SOCIAL HISTORY:  Negative for smoking/alcohol/drug use.     ALLERGIES:  No Known Allergies    MEDICATIONS:  STANDING MEDICATIONS  enoxaparin Injectable 40 milliGRAM(s) SubCutaneous every 24 hours  methylPREDNISolone sodium succinate Injectable 40 milliGRAM(s) IV Push daily  QUEtiapine 25 milliGRAM(s) Oral at bedtime    PRN MEDICATIONS  albuterol/ipratropium for Nebulization 3 milliLiter(s) Nebulizer every 6 hours PRN  melatonin 3 milliGRAM(s) Oral at bedtime PRN    VITALS:   T(F): 97.3  HR: 88  BP: 181/81  RR: 18  SpO2: 94%    LABS:                        13.1   11.38 )-----------( 221      ( 19 May 2023 07:20 )             38.9     05-19    137  |  102  |  19  ----------------------------<  101<H>  4.4   |  26  |  0.7    Ca    9.9      19 May 2023 07:20  Mg     2.2     05-19    TPro  6.8  /  Alb  4.2  /  TBili  0.7  /  DBili  x   /  AST  23  /  ALT  16  /  AlkPhos  67  05-19              Culture - Sputum (collected 17 May 2023 11:20)  Source: .Sputum Sputum  Gram Stain (18 May 2023 07:38):    Few polymorphonuclear leukocytes per low power field    Rare Squamous epithelial cells per low power field    Rare Gram positive cocci in pairs seen per oil power field    Rare Gram Negative Rods seen per oil power field  Preliminary Report (18 May 2023 19:23):    Normal Respiratory Doreen present    Culture - Blood (collected 16 May 2023 19:45)  Source: .Blood Blood  Preliminary Report (18 May 2023 02:02):    No growth to date.    Culture - Blood (collected 16 May 2023 19:45)  Source: .Blood Blood  Preliminary Report (18 May 2023 02:02):    No growth to date.          RADIOLOGY:    PHYSICAL EXAM:  CONSTITUTIONAL: Well groomed, no apparent distress  EYES: PERRLA and symmetric, EOMI, No conjunctival or scleral injection, non-icteric  ENMT: Oral mucosa with moist membranes. No external nasal lesions; nasal mucosa not inflamed; normal dentition; no pharyngeal injection or exudates  NECK: Supple, symmetric and without tracheal deviation; thyroid gland not enlarged and without palpable masses  RESPIRATORY: Scattered wheezing bilateral lungs; decreased bs on inspiration on the R middle lobe area   CARDIOVASCULAR: RRRR, +S1S2, no murmurs  GASTROINTESTINAL: Soft, non tender, non distended, no rebound, no guarding  MUSCULOSKELETAL: generalized weakness +   SKIN: No rashes or ulcers noted; no subcutaneous nodules or induration palpable  NEUROLOGIC: No focal deficit   PSYCHIATRIC: Appropriate insight/judgment; A+O x 3, mood and affect appropriate, recent/remote memory intact    
Patient is a 77y old  Male who presents with a chief complaint of Dyspnea (19 May 2023 13:20)      Patient seen and examined at bedside.    ALLERGIES:  No Known Allergies    MEDICATIONS:  albuterol/ipratropium for Nebulization 3 milliLiter(s) Nebulizer every 6 hours PRN  enoxaparin Injectable 40 milliGRAM(s) SubCutaneous every 24 hours  melatonin 3 milliGRAM(s) Oral at bedtime PRN  methylPREDNISolone sodium succinate Injectable 40 milliGRAM(s) IV Push daily  QUEtiapine 25 milliGRAM(s) Oral at bedtime    Vital Signs Last 24 Hrs  T(F): 93.3 (19 May 2023 16:17), Max: 98.3 (19 May 2023 00:32)  HR: 99 (19 May 2023 16:17) (88 - 99)  BP: 139/68 (19 May 2023 16:17) (139/68 - 181/81)  RR: 18 (19 May 2023 16:17) (18 - 18)  SpO2: 95% (19 May 2023 16:17) (94% - 95%)  I&O's Summary    18 May 2023 07:01  -  19 May 2023 07:00  --------------------------------------------------------  IN: 240 mL / OUT: 350 mL / NET: -110 mL        PHYSICAL EXAM:  General: NAD, A/O x 1, agitated at times   ENT: MMM  Neck: Supple, No JVD  Lungs: scattered rhonchi   Cardio: RRR, S1/S2, No murmurs  Abdomen: Soft, Nontender, Nondistended; Bowel sounds present  Extremities: No cyanosis, No edema    LABS:                        13.1   11.38 )-----------( 221      ( 19 May 2023 07:20 )             38.9     05-19    137  |  102  |  19  ----------------------------<  101  4.4   |  26  |  0.7    Ca    9.9      19 May 2023 07:20  Mg     2.2     05-19    TPro  6.8  /  Alb  4.2  /  TBili  0.7  /  DBili  x   /  AST  23  /  ALT  16  /  AlkPhos  67  05-19        Lactate, Blood: 1.2 mmol/L (05-17 @ 06:55)            20:07 - VBG - pH: 7.39  | pCO2: 41    | pO2: 64    | Lactate: 2.20             POCT Blood Glucose.: 117 mg/dL (19 May 2023 16:59)  POCT Blood Glucose.: 163 mg/dL (19 May 2023 11:16)  POCT Blood Glucose.: 107 mg/dL (19 May 2023 08:02)  POCT Blood Glucose.: 97 mg/dL (19 May 2023 04:46)  POCT Blood Glucose.: 144 mg/dL (18 May 2023 21:04)          Culture - Sputum (collected 17 May 2023 11:20)  Source: .Sputum Sputum  Gram Stain (18 May 2023 07:38):    Few polymorphonuclear leukocytes per low power field    Rare Squamous epithelial cells per low power field    Rare Gram positive cocci in pairs seen per oil power field    Rare Gram Negative Rods seen per oil power field  Final Report (19 May 2023 17:41):    Normal Respiratory Doreen present    Culture - Blood (collected 16 May 2023 19:45)  Source: .Blood Blood  Preliminary Report (18 May 2023 02:02):    No growth to date.    Culture - Blood (collected 16 May 2023 19:45)  Source: .Blood Blood  Preliminary Report (18 May 2023 02:02):    No growth to date.          RADIOLOGY & ADDITIONAL TESTS:    Care Discussed with Consultants/Other Providers: 
Patient is a 77y old  Male who presents with a chief complaint of Dyspnea (18 May 2023 11:12)      Patient seen and examined at bedside.  Patient reports no difficulty with breathing or chest pain   ALLERGIES:  No Known Allergies    MEDICATIONS:  albuterol/ipratropium for Nebulization 3 milliLiter(s) Nebulizer every 6 hours PRN  cefepime   IVPB 2000 milliGRAM(s) IV Intermittent every 8 hours  doxycycline IVPB 100 milliGRAM(s) IV Intermittent every 12 hours  enoxaparin Injectable 40 milliGRAM(s) SubCutaneous every 24 hours  melatonin 3 milliGRAM(s) Oral at bedtime PRN  methylPREDNISolone sodium succinate Injectable 40 milliGRAM(s) IV Push daily  QUEtiapine 25 milliGRAM(s) Oral at bedtime    Vital Signs Last 24 Hrs  T(F): 98.3 (18 May 2023 15:38), Max: 98.3 (18 May 2023 15:38)  HR: 82 (18 May 2023 15:38) (82 - 88)  BP: 141/71 (18 May 2023 15:38) (114/62 - 164/79)  RR: 18 (18 May 2023 15:38) (18 - 18)  SpO2: 96% (18 May 2023 15:38) (96% - 96%)  I&O's Summary    18 May 2023 07:01  -  18 May 2023 18:32  --------------------------------------------------------  IN: 240 mL / OUT: 350 mL / NET: -110 mL        PHYSICAL EXAM:  General: NAD, A/O x 3  ENT: MMM  Neck: Supple, No JVD  Lungs: scattered rhonchi, barrel chest   Cardio: RRR, S1/S2, No murmurs  Abdomen: Soft, Nontender, Nondistended; Bowel sounds present  Extremities: No cyanosis, No edema    LABS:                        13.0   6.92  )-----------( 166      ( 18 May 2023 08:43 )             38.7     05-18    135  |  100  |  11  ----------------------------<  200  3.9   |  26  |  0.7    Ca    9.0      18 May 2023 08:43  Mg     2.2     05-18    TPro  6.4  /  Alb  4.0  /  TBili  0.9  /  DBili  x   /  AST  16  /  ALT  13  /  AlkPhos  68  05-18        Lactate, Blood: 1.2 mmol/L (05-17 @ 06:55)            20:07 - VBG - pH: 7.39  | pCO2: 41    | pO2: 64    | Lactate: 2.20             POCT Blood Glucose.: 118 mg/dL (18 May 2023 16:32)  POCT Blood Glucose.: 127 mg/dL (18 May 2023 11:33)  POCT Blood Glucose.: 119 mg/dL (18 May 2023 07:59)  POCT Blood Glucose.: 137 mg/dL (17 May 2023 21:37)          Culture - Sputum (collected 17 May 2023 11:20)  Source: .Sputum Sputum  Gram Stain (18 May 2023 07:38):    Few polymorphonuclear leukocytes per low power field    Rare Squamous epithelial cells per low power field    Rare Gram positive cocci in pairs seen per oil power field    Rare Gram Negative Rods seen per oil power field    Culture - Blood (collected 16 May 2023 19:45)  Source: .Blood Blood  Preliminary Report (18 May 2023 02:02):    No growth to date.    Culture - Blood (collected 16 May 2023 19:45)  Source: .Blood Blood  Preliminary Report (18 May 2023 02:02):    No growth to date.          RADIOLOGY & ADDITIONAL TESTS:    Care Discussed with Consultants/Other Providers:

## 2023-05-20 NOTE — DISCHARGE NOTE NURSING/CASE MANAGEMENT/SOCIAL WORK - [id number]_GEN_ALL_CORE
RN Stacey Mireles used for translation in Cantonese of discharge instructions for reinforcement even though patient family speaks English.

## 2023-05-20 NOTE — PROGRESS NOTE ADULT - ASSESSMENT
77-year-old male with a history of bronchiectasis, iron deficiency anemia brought in by family for evaluation of shortness of breath x2 days. 77-year-old male with a history of bronchiectasis, iron deficiency anemia, and alzheimer's brought in by family for evaluation of shortness of breath x2 days. History obtained from patient's daughter at bedside. As per daughter, patient was admitted to [facility] in 08/2022 for similar complaints and was found to have Pneumonia there. Since then patient has had chronic cough and has been feeling weak. Patient had worsening cough for the past couple days and felt sudden shortness of breath and palpitations prompting the family to bring him to ED.  Patient had new productive cough with yellow sputum, chills, body aches and subjective fever.  Denies any runny nose, sore throat, sick contacts, recent travel, chest pain, abdominal pain, vomiting, diarrhea, leg pain and swelling.     #Suspected gram negative pneumonia in a setting of h/o bronchiectasis  #Sepsis present on admission  - febrile with Tmax 102.2 F  - Leukocytosis with WBC of 12.68   - tachypneic with RR 34   - lactate 2.2  - hypoxic with spO2 of 88% on RA; was placed on BiPAP with improvement in spO2   - remains on 3 L of NC -- WILL WEAN OFF  - s/p Azithromycin + Rocephin in the ED   - s/p Duonebs in the ED   - CXR: Left midlung opacities. Right mid to lower lung pleural plaques and opacities. No pneumothorax. No significant pleural effusion on frontal view. Stable cardiomediastinal silhouette  - CT chest: Right middle lobe right lower lobe, and left upper lobe bronchiectasis is present. Near-complete atelectasis of the right lower lobe. Scattered tree-in-bud nodules seen along the periphery of the right middle lobe. Mild tree-in-bud nodules are also seen in the superior segment of the left lower lobe (5/145). Findings are suspicious for infection. TB should be excluded. 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  - f/u MRSA swab  - f/u RVP   - f/u procal     - ID consulted :  Discussed with family member -- long standing history of bronchiectasis since childhood - last hospitalization in 7/2022 at [facility]; last treated in March 2023 for bronchiectasis flare with levofloxacin.  likely bronchiectasis flare, but at risk for MAC/ALLEN - no previous films to compare CT imaging . would try to reach out to his pulmonologist if worked up for NTM -- if not,  would check AFB cx x 3 for NTM work-up (low suspicion for Pulmonary tuberculosis). check quantiferon gold. continue cefepime 2g q 8 hours. continue doxycycline 100 mg BID for 5 days. follow-up blood cx. trend fever curve     #H/O Iron Deficiency anemia   - HgB 12.4   - .2  - Obtain iron studies   - f/u Vitamin B12 and Folate levels   - Monitor CBC for now     #H/O Alzheimer's   - cw home Seroquel 25 mg QD     #DVT PPx: Lovenox subq  #GI PPx: Pantoprazole  #Diet: Regular   #Activity: IAT  #Dispo: SDU      pending: discharge     77-year-old male with a history of bronchiectasis, iron deficiency anemia brought in by family for evaluation of shortness of breath x2 days. 77-year-old male with a history of bronchiectasis, iron deficiency anemia, and alzheimer's brought in by family for evaluation of shortness of breath x2 days. History obtained from patient's daughter at bedside. As per daughter, patient was admitted to [facility] in 08/2022 for similar complaints and was found to have Pneumonia there. Since then patient has had chronic cough and has been feeling weak. Patient had worsening cough for the past couple days and felt sudden shortness of breath and palpitations prompting the family to bring him to ED.  Patient had new productive cough with yellow sputum, chills, body aches and subjective fever.  Denies any runny nose, sore throat, sick contacts, recent travel, chest pain, abdominal pain, vomiting, diarrhea, leg pain and swelling.     #Suspected gram negative pneumonia in a setting of h/o bronchiectasis  #Sepsis present on admission  - febrile with Tmax 102.2 F  - Leukocytosis with WBC of 12.68   - tachypneic with RR 34   - lactate 2.2  - hypoxic with spO2 of 88% on RA; was placed on BiPAP with improvement in spO2   - remains on 3 L of NC -- WILL WEAN OFF  - s/p Azithromycin + Rocephin in the ED   - s/p Duonebs in the ED   - CXR: Left midlung opacities. Right mid to lower lung pleural plaques and opacities. No pneumothorax. No significant pleural effusion on frontal view. Stable cardiomediastinal silhouette  - CT chest: Right middle lobe right lower lobe, and left upper lobe bronchiectasis is present. Near-complete atelectasis of the right lower lobe. Scattered tree-in-bud nodules seen along the periphery of the right middle lobe. Mild tree-in-bud nodules are also seen in the superior segment of the left lower lobe (5/145). Findings are suspicious for infection. TB should be excluded. 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  - f/u MRSA swab  - f/u RVP   - f/u procal     - ID consulted :  Discussed with family member -- long standing history of bronchiectasis since childhood - last hospitalization in 7/2022 at [facility]; last treated in March 2023 for bronchiectasis flare with levofloxacin.  likely bronchiectasis flare, but at risk for MAC/ALLEN - no previous films to compare CT imaging . would try to reach out to his pulmonologist if worked up for NTM -- if not,  would check AFB cx x 3 for NTM work-up (low suspicion for Pulmonary tuberculosis). check quantiferon gold. continue cefepime 2g q 8 hours. continue doxycycline 100 mg BID for 5 days. follow-up blood cx. trend fever curve     #H/O Iron Deficiency anemia   - HgB 12.4   - .2  - Obtain iron studies   - f/u Vitamin B12 and Folate levels   - Monitor CBC for now     #H/O Alzheimer's   - cw home Seroquel 25 mg QD     #DVT PPx: Lovenox subq  #GI PPx: Pantoprazole  #Diet: Regular   #Activity: IAT  #Dispo: SDU      pending: discharge

## 2023-05-20 NOTE — DISCHARGE NOTE NURSING/CASE MANAGEMENT/SOCIAL WORK - PATIENT PORTAL LINK FT
You can access the FollowMyHealth Patient Portal offered by [facility] by registering at the following website: [email or web address]. By joining [email or web address]’s FollowMyHealth portal, you will also be able to view your health information using other applications (apps) compatible with our system.

## 2023-05-22 LAB
CULTURE RESULTS: SIGNIFICANT CHANGE UP
CULTURE RESULTS: SIGNIFICANT CHANGE UP
SPECIMEN SOURCE: SIGNIFICANT CHANGE UP
SPECIMEN SOURCE: SIGNIFICANT CHANGE UP

## 2023-05-23 LAB — VIT B1 SERPL-MCNC: 115.8 NMOL/L — SIGNIFICANT CHANGE UP (ref 66.5–200)

## 2023-05-24 DIAGNOSIS — G30.9 ALZHEIMER'S DISEASE, UNSPECIFIED: ICD-10-CM

## 2023-05-24 DIAGNOSIS — Z77.090 CONTACT WITH AND (SUSPECTED) EXPOSURE TO ASBESTOS: ICD-10-CM

## 2023-05-24 DIAGNOSIS — B97.19 OTHER ENTEROVIRUS AS THE CAUSE OF DISEASES CLASSIFIED ELSEWHERE: ICD-10-CM

## 2023-05-24 DIAGNOSIS — D50.9 IRON DEFICIENCY ANEMIA, UNSPECIFIED: ICD-10-CM

## 2023-05-24 DIAGNOSIS — Z78.1 PHYSICAL RESTRAINT STATUS: ICD-10-CM

## 2023-05-24 DIAGNOSIS — J96.01 ACUTE RESPIRATORY FAILURE WITH HYPOXIA: ICD-10-CM

## 2023-05-24 DIAGNOSIS — J47.0 BRONCHIECTASIS WITH ACUTE LOWER RESPIRATORY INFECTION: ICD-10-CM

## 2023-05-24 DIAGNOSIS — J12.89 OTHER VIRAL PNEUMONIA: ICD-10-CM

## 2023-05-24 DIAGNOSIS — J47.1 BRONCHIECTASIS WITH (ACUTE) EXACERBATION: ICD-10-CM

## 2023-05-24 DIAGNOSIS — R41.0 DISORIENTATION, UNSPECIFIED: ICD-10-CM

## 2023-05-24 DIAGNOSIS — F02.80 DEMENTIA IN OTHER DISEASES CLASSIFIED ELSEWHERE, UNSPECIFIED SEVERITY, WITHOUT BEHAVIORAL DISTURBANCE, PSYCHOTIC DISTURBANCE, MOOD DISTURBANCE, AND ANXIETY: ICD-10-CM

## 2023-05-24 DIAGNOSIS — A41.89 OTHER SPECIFIED SEPSIS: ICD-10-CM

## 2023-06-26 NOTE — SWALLOW BEDSIDE ASSESSMENT ADULT - PHARYNGEAL PHASE
Group Topic: BH Therapeutic Activity    Date: 6/26/2023  Start Time: 1100  End Time: 1200  Facilitators: Fahres, Catherine S, OT    Focus: Distress Tolerance  Number in attendance: 15    Method: Group  Attendance: but left at times  Participation: Moderate  Patient Response: Appeared distracted, Easily frustrated, Non-verbal, Passive and Select interactions  Mood: Anxious, Depressed and Frustrated  Affect: Type: Anxious, Depressed and frustrated at times   Range: Blunted/flat   Congruency: Congruent   Stability: Stable  Behavior/Socialization: Appropriate to group, Cooperative and Engaged  Thought Process: Tracking  Task Performance: Follows directions  Patient Evaluation: Independent - full participation        
w/ thin liquids/Cough post oral intake/Delayed cough post oral intake
Within functional limits

## 2023-07-05 LAB
CULTURE RESULTS: SIGNIFICANT CHANGE UP
SPECIMEN SOURCE: SIGNIFICANT CHANGE UP

## 2023-07-12 PROBLEM — Z00.00 ENCOUNTER FOR PREVENTIVE HEALTH EXAMINATION: Status: ACTIVE | Noted: 2023-07-12

## 2023-07-21 ENCOUNTER — APPOINTMENT (OUTPATIENT)
Age: 78
End: 2023-07-21

## 2023-07-24 PROBLEM — G30.9 ALZHEIMER'S DISEASE, UNSPECIFIED: Chronic | Status: ACTIVE | Noted: 2023-05-17

## 2023-07-24 PROBLEM — Z87.09 PERSONAL HISTORY OF OTHER DISEASES OF THE RESPIRATORY SYSTEM: Chronic | Status: ACTIVE | Noted: 2023-05-16

## 2023-07-24 PROBLEM — Z86.2 PERSONAL HISTORY OF DISEASES OF THE BLOOD AND BLOOD-FORMING ORGANS AND CERTAIN DISORDERS INVOLVING THE IMMUNE MECHANISM: Chronic | Status: ACTIVE | Noted: 2023-05-17

## 2023-08-02 ENCOUNTER — OUTPATIENT (OUTPATIENT)
Dept: OUTPATIENT SERVICES | Facility: HOSPITAL | Age: 78
LOS: 1 days | End: 2023-08-02
Payer: MEDICARE

## 2023-08-02 ENCOUNTER — APPOINTMENT (OUTPATIENT)
Age: 78
End: 2023-08-02

## 2023-08-02 DIAGNOSIS — J43.9 EMPHYSEMA, UNSPECIFIED: ICD-10-CM

## 2023-08-02 DIAGNOSIS — J47.9 BRONCHIECTASIS, UNCOMPLICATED: ICD-10-CM

## 2023-08-02 PROCEDURE — 94626 PHY/QHP OP PULM RHB W/MNTR: CPT

## 2023-08-03 DIAGNOSIS — J43.9 EMPHYSEMA, UNSPECIFIED: ICD-10-CM

## 2023-08-03 DIAGNOSIS — J47.9 BRONCHIECTASIS, UNCOMPLICATED: ICD-10-CM

## 2023-08-08 ENCOUNTER — OUTPATIENT (OUTPATIENT)
Dept: OUTPATIENT SERVICES | Facility: HOSPITAL | Age: 78
LOS: 1 days | End: 2023-08-08

## 2023-08-08 ENCOUNTER — APPOINTMENT (OUTPATIENT)
Age: 78
End: 2023-08-08

## 2023-08-08 DIAGNOSIS — J47.9 BRONCHIECTASIS, UNCOMPLICATED: ICD-10-CM

## 2023-08-08 DIAGNOSIS — Z98.890 OTHER SPECIFIED POSTPROCEDURAL STATES: Chronic | ICD-10-CM

## 2023-08-08 DIAGNOSIS — J43.9 EMPHYSEMA, UNSPECIFIED: ICD-10-CM

## 2023-08-10 ENCOUNTER — OUTPATIENT (OUTPATIENT)
Dept: OUTPATIENT SERVICES | Facility: HOSPITAL | Age: 78
LOS: 1 days | End: 2023-08-10

## 2023-08-10 ENCOUNTER — APPOINTMENT (OUTPATIENT)
Age: 78
End: 2023-08-10

## 2023-08-10 DIAGNOSIS — J47.9 BRONCHIECTASIS, UNCOMPLICATED: ICD-10-CM

## 2023-08-10 DIAGNOSIS — J43.9 EMPHYSEMA, UNSPECIFIED: ICD-10-CM

## 2023-08-10 DIAGNOSIS — Z98.890 OTHER SPECIFIED POSTPROCEDURAL STATES: Chronic | ICD-10-CM

## 2023-08-15 ENCOUNTER — OUTPATIENT (OUTPATIENT)
Dept: OUTPATIENT SERVICES | Facility: HOSPITAL | Age: 78
LOS: 1 days | End: 2023-08-15

## 2023-08-15 ENCOUNTER — APPOINTMENT (OUTPATIENT)
Age: 78
End: 2023-08-15

## 2023-08-15 DIAGNOSIS — J47.9 BRONCHIECTASIS, UNCOMPLICATED: ICD-10-CM

## 2023-08-15 DIAGNOSIS — J43.9 EMPHYSEMA, UNSPECIFIED: ICD-10-CM

## 2023-08-15 DIAGNOSIS — Z98.890 OTHER SPECIFIED POSTPROCEDURAL STATES: Chronic | ICD-10-CM

## 2023-08-17 ENCOUNTER — OUTPATIENT (OUTPATIENT)
Dept: OUTPATIENT SERVICES | Facility: HOSPITAL | Age: 78
LOS: 1 days | End: 2023-08-17

## 2023-08-17 ENCOUNTER — APPOINTMENT (OUTPATIENT)
Age: 78
End: 2023-08-17

## 2023-08-17 DIAGNOSIS — J47.9 BRONCHIECTASIS, UNCOMPLICATED: ICD-10-CM

## 2023-08-17 DIAGNOSIS — J43.9 EMPHYSEMA, UNSPECIFIED: ICD-10-CM

## 2023-08-17 DIAGNOSIS — Z98.890 OTHER SPECIFIED POSTPROCEDURAL STATES: Chronic | ICD-10-CM

## 2023-08-22 ENCOUNTER — APPOINTMENT (OUTPATIENT)
Age: 78
End: 2023-08-22

## 2023-08-24 ENCOUNTER — OUTPATIENT (OUTPATIENT)
Dept: OUTPATIENT SERVICES | Facility: HOSPITAL | Age: 78
LOS: 1 days | End: 2023-08-24

## 2023-08-24 ENCOUNTER — APPOINTMENT (OUTPATIENT)
Age: 78
End: 2023-08-24

## 2023-08-24 DIAGNOSIS — J47.9 BRONCHIECTASIS, UNCOMPLICATED: ICD-10-CM

## 2023-08-24 DIAGNOSIS — J43.9 EMPHYSEMA, UNSPECIFIED: ICD-10-CM

## 2023-08-24 DIAGNOSIS — Z98.890 OTHER SPECIFIED POSTPROCEDURAL STATES: Chronic | ICD-10-CM

## 2023-08-29 ENCOUNTER — APPOINTMENT (OUTPATIENT)
Age: 78
End: 2023-08-29

## 2023-08-31 ENCOUNTER — APPOINTMENT (OUTPATIENT)
Age: 78
End: 2023-08-31

## 2023-09-05 ENCOUNTER — APPOINTMENT (OUTPATIENT)
Age: 78
End: 2023-09-05

## 2023-09-07 ENCOUNTER — OUTPATIENT (OUTPATIENT)
Dept: OUTPATIENT SERVICES | Facility: HOSPITAL | Age: 78
LOS: 1 days | End: 2023-09-07
Payer: MEDICARE

## 2023-09-07 ENCOUNTER — APPOINTMENT (OUTPATIENT)
Age: 78
End: 2023-09-07

## 2023-09-07 DIAGNOSIS — J43.9 EMPHYSEMA, UNSPECIFIED: ICD-10-CM

## 2023-09-07 DIAGNOSIS — Z98.890 OTHER SPECIFIED POSTPROCEDURAL STATES: Chronic | ICD-10-CM

## 2023-09-07 PROCEDURE — 94626 PHY/QHP OP PULM RHB W/MNTR: CPT

## 2023-09-08 DIAGNOSIS — J43.9 EMPHYSEMA, UNSPECIFIED: ICD-10-CM

## 2023-09-12 ENCOUNTER — APPOINTMENT (OUTPATIENT)
Age: 78
End: 2023-09-12

## 2023-09-12 ENCOUNTER — OUTPATIENT (OUTPATIENT)
Dept: OUTPATIENT SERVICES | Facility: HOSPITAL | Age: 78
LOS: 1 days | End: 2023-09-12

## 2023-09-12 DIAGNOSIS — J43.9 EMPHYSEMA, UNSPECIFIED: ICD-10-CM

## 2023-09-12 DIAGNOSIS — Z98.890 OTHER SPECIFIED POSTPROCEDURAL STATES: Chronic | ICD-10-CM

## 2023-09-14 ENCOUNTER — APPOINTMENT (OUTPATIENT)
Age: 78
End: 2023-09-14

## 2023-09-19 ENCOUNTER — APPOINTMENT (OUTPATIENT)
Age: 78
End: 2023-09-19

## 2023-09-19 ENCOUNTER — OUTPATIENT (OUTPATIENT)
Dept: OUTPATIENT SERVICES | Facility: HOSPITAL | Age: 78
LOS: 1 days | End: 2023-09-19

## 2023-09-19 DIAGNOSIS — J43.9 EMPHYSEMA, UNSPECIFIED: ICD-10-CM

## 2023-09-19 DIAGNOSIS — Z98.890 OTHER SPECIFIED POSTPROCEDURAL STATES: Chronic | ICD-10-CM

## 2023-09-21 ENCOUNTER — APPOINTMENT (OUTPATIENT)
Age: 78
End: 2023-09-21

## 2023-09-21 ENCOUNTER — OUTPATIENT (OUTPATIENT)
Dept: OUTPATIENT SERVICES | Facility: HOSPITAL | Age: 78
LOS: 1 days | End: 2023-09-21

## 2023-09-21 DIAGNOSIS — Z98.890 OTHER SPECIFIED POSTPROCEDURAL STATES: Chronic | ICD-10-CM

## 2023-09-21 DIAGNOSIS — J43.9 EMPHYSEMA, UNSPECIFIED: ICD-10-CM

## 2023-09-26 ENCOUNTER — OUTPATIENT (OUTPATIENT)
Dept: OUTPATIENT SERVICES | Facility: HOSPITAL | Age: 78
LOS: 1 days | End: 2023-09-26

## 2023-09-26 ENCOUNTER — APPOINTMENT (OUTPATIENT)
Age: 78
End: 2023-09-26

## 2023-09-26 DIAGNOSIS — Z98.890 OTHER SPECIFIED POSTPROCEDURAL STATES: Chronic | ICD-10-CM

## 2023-09-26 DIAGNOSIS — J43.9 EMPHYSEMA, UNSPECIFIED: ICD-10-CM

## 2023-09-28 ENCOUNTER — OUTPATIENT (OUTPATIENT)
Dept: OUTPATIENT SERVICES | Facility: HOSPITAL | Age: 78
LOS: 1 days | End: 2023-09-28

## 2023-09-28 ENCOUNTER — APPOINTMENT (OUTPATIENT)
Age: 78
End: 2023-09-28

## 2023-09-28 DIAGNOSIS — Z98.890 OTHER SPECIFIED POSTPROCEDURAL STATES: Chronic | ICD-10-CM

## 2023-09-28 DIAGNOSIS — J43.9 EMPHYSEMA, UNSPECIFIED: ICD-10-CM

## 2023-10-03 ENCOUNTER — OUTPATIENT (OUTPATIENT)
Dept: OUTPATIENT SERVICES | Facility: HOSPITAL | Age: 78
LOS: 1 days | End: 2023-10-03
Payer: MEDICARE

## 2023-10-03 ENCOUNTER — APPOINTMENT (OUTPATIENT)
Age: 78
End: 2023-10-03

## 2023-10-03 DIAGNOSIS — J43.9 EMPHYSEMA, UNSPECIFIED: ICD-10-CM

## 2023-10-03 DIAGNOSIS — Z98.890 OTHER SPECIFIED POSTPROCEDURAL STATES: Chronic | ICD-10-CM

## 2023-10-03 PROCEDURE — 94626 PHY/QHP OP PULM RHB W/MNTR: CPT

## 2023-10-04 DIAGNOSIS — J43.9 EMPHYSEMA, UNSPECIFIED: ICD-10-CM

## 2023-10-05 ENCOUNTER — OUTPATIENT (OUTPATIENT)
Dept: OUTPATIENT SERVICES | Facility: HOSPITAL | Age: 78
LOS: 1 days | End: 2023-10-05

## 2023-10-05 ENCOUNTER — APPOINTMENT (OUTPATIENT)
Age: 78
End: 2023-10-05

## 2023-10-05 DIAGNOSIS — Z98.890 OTHER SPECIFIED POSTPROCEDURAL STATES: Chronic | ICD-10-CM

## 2023-10-05 DIAGNOSIS — J43.9 EMPHYSEMA, UNSPECIFIED: ICD-10-CM

## 2023-10-10 ENCOUNTER — APPOINTMENT (OUTPATIENT)
Age: 78
End: 2023-10-10

## 2023-10-12 ENCOUNTER — APPOINTMENT (OUTPATIENT)
Age: 78
End: 2023-10-12

## 2023-10-17 ENCOUNTER — APPOINTMENT (OUTPATIENT)
Age: 78
End: 2023-10-17

## 2023-10-19 ENCOUNTER — APPOINTMENT (OUTPATIENT)
Age: 78
End: 2023-10-19

## 2023-10-24 ENCOUNTER — APPOINTMENT (OUTPATIENT)
Age: 78
End: 2023-10-24

## 2023-10-24 ENCOUNTER — OUTPATIENT (OUTPATIENT)
Dept: OUTPATIENT SERVICES | Facility: HOSPITAL | Age: 78
LOS: 1 days | End: 2023-10-24

## 2023-10-24 DIAGNOSIS — J43.9 EMPHYSEMA, UNSPECIFIED: ICD-10-CM

## 2023-10-24 DIAGNOSIS — Z98.890 OTHER SPECIFIED POSTPROCEDURAL STATES: Chronic | ICD-10-CM

## 2023-10-26 ENCOUNTER — APPOINTMENT (OUTPATIENT)
Age: 78
End: 2023-10-26

## 2023-10-26 ENCOUNTER — OUTPATIENT (OUTPATIENT)
Dept: OUTPATIENT SERVICES | Facility: HOSPITAL | Age: 78
LOS: 1 days | End: 2023-10-26

## 2023-10-26 DIAGNOSIS — J43.9 EMPHYSEMA, UNSPECIFIED: ICD-10-CM

## 2023-10-26 DIAGNOSIS — Z98.890 OTHER SPECIFIED POSTPROCEDURAL STATES: Chronic | ICD-10-CM

## 2023-10-31 ENCOUNTER — APPOINTMENT (OUTPATIENT)
Age: 78
End: 2023-10-31

## 2023-11-02 ENCOUNTER — APPOINTMENT (OUTPATIENT)
Age: 78
End: 2023-11-02

## 2023-11-07 ENCOUNTER — APPOINTMENT (OUTPATIENT)
Age: 78
End: 2023-11-07

## 2023-11-09 ENCOUNTER — APPOINTMENT (OUTPATIENT)
Age: 78
End: 2023-11-09

## 2023-11-14 ENCOUNTER — APPOINTMENT (OUTPATIENT)
Age: 78
End: 2023-11-14

## 2023-11-16 ENCOUNTER — APPOINTMENT (OUTPATIENT)
Age: 78
End: 2023-11-16

## 2023-11-21 ENCOUNTER — APPOINTMENT (OUTPATIENT)
Age: 78
End: 2023-11-21

## 2023-11-28 ENCOUNTER — APPOINTMENT (OUTPATIENT)
Age: 78
End: 2023-11-28

## 2023-11-30 ENCOUNTER — APPOINTMENT (OUTPATIENT)
Age: 78
End: 2023-11-30

## 2023-12-05 ENCOUNTER — APPOINTMENT (OUTPATIENT)
Age: 78
End: 2023-12-05

## 2023-12-07 ENCOUNTER — APPOINTMENT (OUTPATIENT)
Age: 78
End: 2023-12-07

## 2023-12-12 ENCOUNTER — APPOINTMENT (OUTPATIENT)
Age: 78
End: 2023-12-12

## 2023-12-14 ENCOUNTER — APPOINTMENT (OUTPATIENT)
Age: 78
End: 2023-12-14

## 2023-12-19 ENCOUNTER — APPOINTMENT (OUTPATIENT)
Age: 78
End: 2023-12-19

## 2023-12-21 ENCOUNTER — APPOINTMENT (OUTPATIENT)
Age: 78
End: 2023-12-21

## 2023-12-26 ENCOUNTER — APPOINTMENT (OUTPATIENT)
Age: 78
End: 2023-12-26

## 2023-12-28 ENCOUNTER — APPOINTMENT (OUTPATIENT)
Age: 78
End: 2023-12-28

## 2024-05-20 NOTE — PATIENT PROFILE ADULT - NSTOBACCONEVERSMOKERY/N_GEN_A
Called patient to informed of results patient did not answer I was bale to leave a voice mail for patient to call back.    No

## 2024-08-30 ENCOUNTER — APPOINTMENT (OUTPATIENT)
Dept: OTOLARYNGOLOGY | Facility: CLINIC | Age: 79
End: 2024-08-30

## 2024-10-22 ENCOUNTER — APPOINTMENT (OUTPATIENT)
Dept: OTOLARYNGOLOGY | Facility: CLINIC | Age: 79
End: 2024-10-22
Payer: MEDICARE

## 2024-10-22 DIAGNOSIS — R42 DIZZINESS AND GIDDINESS: ICD-10-CM

## 2024-10-22 PROCEDURE — 92552 PURE TONE AUDIOMETRY AIR: CPT

## 2024-10-22 PROCEDURE — 99203 OFFICE O/P NEW LOW 30 MIN: CPT

## 2024-10-22 PROCEDURE — 92555 SPEECH THRESHOLD AUDIOMETRY: CPT

## 2024-12-03 ENCOUNTER — APPOINTMENT (OUTPATIENT)
Dept: OTOLARYNGOLOGY | Facility: CLINIC | Age: 79
End: 2024-12-03
Payer: MEDICARE

## 2024-12-03 DIAGNOSIS — R42 DIZZINESS AND GIDDINESS: ICD-10-CM

## 2024-12-03 PROCEDURE — 92541 SPONTANEOUS NYSTAGMUS TEST: CPT | Mod: 52

## 2024-12-03 PROCEDURE — 92544 OPTOKINETIC NYSTAGMUS TEST: CPT | Mod: 59,52

## 2024-12-03 PROCEDURE — 92545 OSCILLATING TRACKING TEST: CPT | Mod: 59,52

## 2024-12-03 PROCEDURE — 99213 OFFICE O/P EST LOW 20 MIN: CPT

## 2024-12-03 PROCEDURE — 92547 SUPPLEMENTAL ELECTRICAL TEST: CPT

## 2024-12-03 RX ORDER — OFLOXACIN OTIC 3 MG/ML
0.3 SOLUTION AURICULAR (OTIC)
Qty: 1 | Refills: 1 | Status: ACTIVE | COMMUNITY
Start: 2024-12-03 | End: 1900-01-01